# Patient Record
Sex: MALE | Race: WHITE | NOT HISPANIC OR LATINO | Employment: FULL TIME | URBAN - METROPOLITAN AREA
[De-identification: names, ages, dates, MRNs, and addresses within clinical notes are randomized per-mention and may not be internally consistent; named-entity substitution may affect disease eponyms.]

---

## 2017-01-05 ENCOUNTER — APPOINTMENT (OUTPATIENT)
Dept: PHYSICAL THERAPY | Age: 32
End: 2017-01-05
Payer: OTHER MISCELLANEOUS

## 2017-01-05 PROCEDURE — 97161 PT EVAL LOW COMPLEX 20 MIN: CPT

## 2017-01-09 ENCOUNTER — APPOINTMENT (OUTPATIENT)
Dept: PHYSICAL THERAPY | Age: 32
End: 2017-01-09
Payer: OTHER MISCELLANEOUS

## 2017-01-09 PROCEDURE — 97010 HOT OR COLD PACKS THERAPY: CPT

## 2017-01-09 PROCEDURE — 97110 THERAPEUTIC EXERCISES: CPT

## 2017-01-12 ENCOUNTER — APPOINTMENT (OUTPATIENT)
Dept: PHYSICAL THERAPY | Age: 32
End: 2017-01-12
Payer: OTHER MISCELLANEOUS

## 2017-01-12 PROCEDURE — 97010 HOT OR COLD PACKS THERAPY: CPT

## 2017-01-12 PROCEDURE — 97110 THERAPEUTIC EXERCISES: CPT

## 2017-01-16 ENCOUNTER — APPOINTMENT (OUTPATIENT)
Dept: PHYSICAL THERAPY | Age: 32
End: 2017-01-16
Payer: OTHER MISCELLANEOUS

## 2017-01-16 PROCEDURE — 97110 THERAPEUTIC EXERCISES: CPT

## 2017-01-16 PROCEDURE — 97010 HOT OR COLD PACKS THERAPY: CPT

## 2017-01-16 PROCEDURE — 97140 MANUAL THERAPY 1/> REGIONS: CPT

## 2017-01-18 ENCOUNTER — APPOINTMENT (OUTPATIENT)
Dept: PHYSICAL THERAPY | Age: 32
End: 2017-01-18
Payer: OTHER MISCELLANEOUS

## 2017-01-18 PROCEDURE — 97140 MANUAL THERAPY 1/> REGIONS: CPT

## 2017-01-18 PROCEDURE — 97110 THERAPEUTIC EXERCISES: CPT

## 2017-01-18 PROCEDURE — 97010 HOT OR COLD PACKS THERAPY: CPT

## 2017-01-23 ENCOUNTER — APPOINTMENT (OUTPATIENT)
Dept: PHYSICAL THERAPY | Age: 32
End: 2017-01-23
Payer: OTHER MISCELLANEOUS

## 2017-01-23 PROCEDURE — 97140 MANUAL THERAPY 1/> REGIONS: CPT

## 2017-01-23 PROCEDURE — 97110 THERAPEUTIC EXERCISES: CPT

## 2017-01-26 ENCOUNTER — APPOINTMENT (OUTPATIENT)
Dept: PHYSICAL THERAPY | Age: 32
End: 2017-01-26
Payer: OTHER MISCELLANEOUS

## 2017-01-26 PROCEDURE — 97110 THERAPEUTIC EXERCISES: CPT

## 2017-01-26 PROCEDURE — 97140 MANUAL THERAPY 1/> REGIONS: CPT

## 2017-01-30 ENCOUNTER — APPOINTMENT (OUTPATIENT)
Dept: PHYSICAL THERAPY | Age: 32
End: 2017-01-30
Payer: OTHER MISCELLANEOUS

## 2017-01-30 PROCEDURE — 97140 MANUAL THERAPY 1/> REGIONS: CPT

## 2017-01-30 PROCEDURE — 97110 THERAPEUTIC EXERCISES: CPT

## 2017-01-30 PROCEDURE — 97010 HOT OR COLD PACKS THERAPY: CPT

## 2017-02-02 ENCOUNTER — APPOINTMENT (OUTPATIENT)
Dept: PHYSICAL THERAPY | Age: 32
End: 2017-02-02
Payer: OTHER MISCELLANEOUS

## 2017-02-02 PROCEDURE — 97140 MANUAL THERAPY 1/> REGIONS: CPT

## 2017-02-02 PROCEDURE — 97110 THERAPEUTIC EXERCISES: CPT

## 2017-02-06 ENCOUNTER — APPOINTMENT (OUTPATIENT)
Dept: PHYSICAL THERAPY | Age: 32
End: 2017-02-06
Payer: OTHER MISCELLANEOUS

## 2017-02-06 PROCEDURE — 97140 MANUAL THERAPY 1/> REGIONS: CPT

## 2017-02-06 PROCEDURE — 97010 HOT OR COLD PACKS THERAPY: CPT

## 2017-02-06 PROCEDURE — 97110 THERAPEUTIC EXERCISES: CPT

## 2017-02-08 ENCOUNTER — APPOINTMENT (OUTPATIENT)
Dept: PHYSICAL THERAPY | Age: 32
End: 2017-02-08
Payer: OTHER MISCELLANEOUS

## 2017-02-08 PROCEDURE — 97110 THERAPEUTIC EXERCISES: CPT

## 2017-02-08 PROCEDURE — 97140 MANUAL THERAPY 1/> REGIONS: CPT

## 2017-02-08 PROCEDURE — 97010 HOT OR COLD PACKS THERAPY: CPT

## 2017-02-09 ENCOUNTER — APPOINTMENT (OUTPATIENT)
Dept: PHYSICAL THERAPY | Age: 32
End: 2017-02-09
Payer: OTHER MISCELLANEOUS

## 2017-02-14 ENCOUNTER — APPOINTMENT (OUTPATIENT)
Dept: PHYSICAL THERAPY | Age: 32
End: 2017-02-14
Payer: OTHER MISCELLANEOUS

## 2017-02-14 PROCEDURE — 97140 MANUAL THERAPY 1/> REGIONS: CPT

## 2017-02-14 PROCEDURE — 97010 HOT OR COLD PACKS THERAPY: CPT

## 2017-02-14 PROCEDURE — 97110 THERAPEUTIC EXERCISES: CPT

## 2017-02-16 ENCOUNTER — APPOINTMENT (OUTPATIENT)
Dept: PHYSICAL THERAPY | Age: 32
End: 2017-02-16
Payer: OTHER MISCELLANEOUS

## 2017-02-16 PROCEDURE — 97140 MANUAL THERAPY 1/> REGIONS: CPT

## 2017-02-16 PROCEDURE — 97110 THERAPEUTIC EXERCISES: CPT

## 2017-02-16 PROCEDURE — 97010 HOT OR COLD PACKS THERAPY: CPT

## 2017-02-20 ENCOUNTER — APPOINTMENT (OUTPATIENT)
Dept: PHYSICAL THERAPY | Age: 32
End: 2017-02-20
Payer: OTHER MISCELLANEOUS

## 2017-02-20 PROCEDURE — 97010 HOT OR COLD PACKS THERAPY: CPT

## 2017-02-20 PROCEDURE — 97140 MANUAL THERAPY 1/> REGIONS: CPT

## 2017-02-20 PROCEDURE — 97110 THERAPEUTIC EXERCISES: CPT

## 2017-02-23 ENCOUNTER — APPOINTMENT (OUTPATIENT)
Dept: PHYSICAL THERAPY | Age: 32
End: 2017-02-23
Payer: OTHER MISCELLANEOUS

## 2017-02-23 PROCEDURE — 97110 THERAPEUTIC EXERCISES: CPT

## 2017-02-23 PROCEDURE — 97140 MANUAL THERAPY 1/> REGIONS: CPT

## 2017-02-23 PROCEDURE — 97010 HOT OR COLD PACKS THERAPY: CPT

## 2017-02-27 ENCOUNTER — APPOINTMENT (OUTPATIENT)
Dept: PHYSICAL THERAPY | Age: 32
End: 2017-02-27
Payer: OTHER MISCELLANEOUS

## 2017-02-27 PROCEDURE — 97110 THERAPEUTIC EXERCISES: CPT

## 2017-03-01 ENCOUNTER — APPOINTMENT (OUTPATIENT)
Dept: PHYSICAL THERAPY | Age: 32
End: 2017-03-01
Payer: OTHER MISCELLANEOUS

## 2017-03-01 PROCEDURE — 97140 MANUAL THERAPY 1/> REGIONS: CPT

## 2017-03-01 PROCEDURE — 97110 THERAPEUTIC EXERCISES: CPT

## 2017-03-02 ENCOUNTER — APPOINTMENT (OUTPATIENT)
Dept: PHYSICAL THERAPY | Age: 32
End: 2017-03-02
Payer: OTHER MISCELLANEOUS

## 2017-03-02 PROCEDURE — 97110 THERAPEUTIC EXERCISES: CPT

## 2017-03-02 PROCEDURE — 97140 MANUAL THERAPY 1/> REGIONS: CPT

## 2017-03-13 ENCOUNTER — APPOINTMENT (OUTPATIENT)
Dept: PHYSICAL THERAPY | Age: 32
End: 2017-03-13
Payer: OTHER MISCELLANEOUS

## 2017-03-13 PROCEDURE — 97110 THERAPEUTIC EXERCISES: CPT

## 2017-03-13 PROCEDURE — 97010 HOT OR COLD PACKS THERAPY: CPT

## 2017-03-15 ENCOUNTER — APPOINTMENT (OUTPATIENT)
Dept: PHYSICAL THERAPY | Age: 32
End: 2017-03-15
Payer: OTHER MISCELLANEOUS

## 2017-03-15 PROCEDURE — 97010 HOT OR COLD PACKS THERAPY: CPT

## 2017-03-15 PROCEDURE — 97110 THERAPEUTIC EXERCISES: CPT

## 2017-03-15 PROCEDURE — 97112 NEUROMUSCULAR REEDUCATION: CPT

## 2017-03-16 ENCOUNTER — APPOINTMENT (OUTPATIENT)
Dept: PHYSICAL THERAPY | Age: 32
End: 2017-03-16
Payer: OTHER MISCELLANEOUS

## 2017-03-16 PROCEDURE — 97110 THERAPEUTIC EXERCISES: CPT

## 2017-03-16 PROCEDURE — 97140 MANUAL THERAPY 1/> REGIONS: CPT

## 2019-05-06 ENCOUNTER — APPOINTMENT (EMERGENCY)
Dept: CT IMAGING | Facility: HOSPITAL | Age: 34
End: 2019-05-06
Payer: COMMERCIAL

## 2019-05-06 ENCOUNTER — HOSPITAL ENCOUNTER (EMERGENCY)
Facility: HOSPITAL | Age: 34
Discharge: HOME | End: 2019-05-06
Attending: EMERGENCY MEDICINE | Admitting: EMERGENCY MEDICINE
Payer: COMMERCIAL

## 2019-05-06 VITALS
SYSTOLIC BLOOD PRESSURE: 143 MMHG | TEMPERATURE: 97.5 F | HEIGHT: 72 IN | HEART RATE: 56 BPM | RESPIRATION RATE: 20 BRPM | BODY MASS INDEX: 37.89 KG/M2 | DIASTOLIC BLOOD PRESSURE: 72 MMHG | WEIGHT: 279.76 LBS | OXYGEN SATURATION: 100 %

## 2019-05-06 DIAGNOSIS — N20.0 KIDNEY STONE ON LEFT SIDE: Primary | ICD-10-CM

## 2019-05-06 LAB
ALBUMIN SERPL BCP-MCNC: 4 G/DL (ref 3.5–5)
ALP SERPL-CCNC: 90 U/L (ref 46–116)
ALT SERPL W P-5'-P-CCNC: 24 U/L (ref 12–78)
ANION GAP SERPL CALCULATED.3IONS-SCNC: 6 MMOL/L (ref 4–13)
AST SERPL W P-5'-P-CCNC: 12 U/L (ref 5–45)
BACTERIA UR QL AUTO: ABNORMAL /HPF
BASOPHILS # BLD AUTO: 0.03 THOUSANDS/ΜL (ref 0–0.1)
BASOPHILS NFR BLD AUTO: 0 % (ref 0–1)
BILIRUB SERPL-MCNC: 0.5 MG/DL (ref 0.2–1)
BILIRUB UR QL STRIP: NEGATIVE
BUN SERPL-MCNC: 17 MG/DL (ref 5–25)
CALCIUM SERPL-MCNC: 9.3 MG/DL (ref 8.3–10.1)
CAOX CRY URNS QL MICRO: ABNORMAL /HPF
CHLORIDE SERPL-SCNC: 102 MMOL/L (ref 100–108)
CLARITY UR: CLEAR
CO2 SERPL-SCNC: 31 MMOL/L (ref 21–32)
COLOR UR: YELLOW
CREAT SERPL-MCNC: 0.86 MG/DL (ref 0.6–1.3)
EOSINOPHIL # BLD AUTO: 0.19 THOUSAND/ΜL (ref 0–0.61)
EOSINOPHIL NFR BLD AUTO: 3 % (ref 0–6)
ERYTHROCYTE [DISTWIDTH] IN BLOOD BY AUTOMATED COUNT: 13.1 % (ref 11.6–15.1)
GFR SERPL CREATININE-BSD FRML MDRD: 112 ML/MIN/1.73SQ M
GLUCOSE SERPL-MCNC: 97 MG/DL (ref 65–140)
GLUCOSE UR STRIP-MCNC: NEGATIVE MG/DL
HCT VFR BLD AUTO: 45.5 % (ref 36.5–49.3)
HGB BLD-MCNC: 15.4 G/DL (ref 12–17)
HGB UR QL STRIP.AUTO: ABNORMAL
IMM GRANULOCYTES # BLD AUTO: 0.02 THOUSAND/UL (ref 0–0.2)
IMM GRANULOCYTES NFR BLD AUTO: 0 % (ref 0–2)
KETONES UR STRIP-MCNC: ABNORMAL MG/DL
LEUKOCYTE ESTERASE UR QL STRIP: ABNORMAL
LIPASE SERPL-CCNC: 226 U/L (ref 73–393)
LYMPHOCYTES # BLD AUTO: 2.62 THOUSANDS/ΜL (ref 0.6–4.47)
LYMPHOCYTES NFR BLD AUTO: 35 % (ref 14–44)
MCH RBC QN AUTO: 30.5 PG (ref 26.8–34.3)
MCHC RBC AUTO-ENTMCNC: 33.8 G/DL (ref 31.4–37.4)
MCV RBC AUTO: 90 FL (ref 82–98)
MONOCYTES # BLD AUTO: 0.6 THOUSAND/ΜL (ref 0.17–1.22)
MONOCYTES NFR BLD AUTO: 8 % (ref 4–12)
MUCOUS THREADS UR QL AUTO: ABNORMAL
NEUTROPHILS # BLD AUTO: 4.07 THOUSANDS/ΜL (ref 1.85–7.62)
NEUTS SEG NFR BLD AUTO: 54 % (ref 43–75)
NITRITE UR QL STRIP: NEGATIVE
NON-SQ EPI CELLS URNS QL MICRO: ABNORMAL /HPF
NRBC BLD AUTO-RTO: 0 /100 WBCS
PH UR STRIP.AUTO: 5.5 [PH]
PLATELET # BLD AUTO: 198 THOUSANDS/UL (ref 149–390)
PMV BLD AUTO: 10.6 FL (ref 8.9–12.7)
POTASSIUM SERPL-SCNC: 3.7 MMOL/L (ref 3.5–5.3)
PROT SERPL-MCNC: 7.6 G/DL (ref 6.4–8.2)
PROT UR STRIP-MCNC: ABNORMAL MG/DL
RBC # BLD AUTO: 5.05 MILLION/UL (ref 3.88–5.62)
RBC #/AREA URNS AUTO: ABNORMAL /HPF
SODIUM SERPL-SCNC: 139 MMOL/L (ref 136–145)
SP GR UR STRIP.AUTO: >=1.03 (ref 1–1.03)
UROBILINOGEN UR QL STRIP.AUTO: 1 E.U./DL
WBC # BLD AUTO: 7.53 THOUSAND/UL (ref 4.31–10.16)
WBC #/AREA URNS AUTO: ABNORMAL /HPF

## 2019-05-06 PROCEDURE — 80053 COMPREHEN METABOLIC PANEL: CPT | Performed by: EMERGENCY MEDICINE

## 2019-05-06 PROCEDURE — 85025 COMPLETE CBC W/AUTO DIFF WBC: CPT | Performed by: EMERGENCY MEDICINE

## 2019-05-06 PROCEDURE — 96374 THER/PROPH/DIAG INJ IV PUSH: CPT

## 2019-05-06 PROCEDURE — 96375 TX/PRO/DX INJ NEW DRUG ADDON: CPT

## 2019-05-06 PROCEDURE — 83690 ASSAY OF LIPASE: CPT | Performed by: EMERGENCY MEDICINE

## 2019-05-06 PROCEDURE — 99284 EMERGENCY DEPT VISIT MOD MDM: CPT

## 2019-05-06 PROCEDURE — 99284 EMERGENCY DEPT VISIT MOD MDM: CPT | Performed by: EMERGENCY MEDICINE

## 2019-05-06 PROCEDURE — 81001 URINALYSIS AUTO W/SCOPE: CPT | Performed by: EMERGENCY MEDICINE

## 2019-05-06 PROCEDURE — 74176 CT ABD & PELVIS W/O CONTRAST: CPT

## 2019-05-06 PROCEDURE — 36415 COLL VENOUS BLD VENIPUNCTURE: CPT | Performed by: EMERGENCY MEDICINE

## 2019-05-06 RX ORDER — TAMSULOSIN HYDROCHLORIDE 0.4 MG/1
0.4 CAPSULE ORAL
Qty: 7 CAPSULE | Refills: 0 | Status: SHIPPED | OUTPATIENT
Start: 2019-05-06 | End: 2020-03-04

## 2019-05-06 RX ORDER — NAPROXEN 500 MG/1
500 TABLET ORAL 2 TIMES DAILY PRN
Qty: 20 TABLET | Refills: 0 | Status: SHIPPED | OUTPATIENT
Start: 2019-05-06 | End: 2020-03-04

## 2019-05-06 RX ORDER — ONDANSETRON 2 MG/ML
4 INJECTION INTRAMUSCULAR; INTRAVENOUS ONCE
Status: COMPLETED | OUTPATIENT
Start: 2019-05-06 | End: 2019-05-06

## 2019-05-06 RX ORDER — HYDROMORPHONE HCL/PF 1 MG/ML
1 SYRINGE (ML) INJECTION ONCE
Status: DISCONTINUED | OUTPATIENT
Start: 2019-05-06 | End: 2019-05-06

## 2019-05-06 RX ORDER — KETOROLAC TROMETHAMINE 30 MG/ML
15 INJECTION, SOLUTION INTRAMUSCULAR; INTRAVENOUS ONCE
Status: COMPLETED | OUTPATIENT
Start: 2019-05-06 | End: 2019-05-06

## 2019-05-06 RX ORDER — MORPHINE SULFATE 15 MG/1
15 TABLET ORAL EVERY 6 HOURS PRN
Qty: 7 TABLET | Refills: 0 | Status: SHIPPED | OUTPATIENT
Start: 2019-05-06 | End: 2019-05-13

## 2019-05-06 RX ORDER — ONDANSETRON 4 MG/1
4 TABLET, ORALLY DISINTEGRATING ORAL EVERY 8 HOURS PRN
Qty: 10 TABLET | Refills: 0 | Status: SHIPPED | OUTPATIENT
Start: 2019-05-06 | End: 2020-03-04

## 2019-05-06 RX ORDER — HYDROMORPHONE HCL/PF 1 MG/ML
1 SYRINGE (ML) INJECTION ONCE AS NEEDED
Status: DISCONTINUED | OUTPATIENT
Start: 2019-05-06 | End: 2019-05-06 | Stop reason: HOSPADM

## 2019-05-06 RX ADMIN — ONDANSETRON 4 MG: 2 INJECTION INTRAMUSCULAR; INTRAVENOUS at 00:57

## 2019-05-06 RX ADMIN — KETOROLAC TROMETHAMINE 15 MG: 30 INJECTION, SOLUTION INTRAMUSCULAR at 00:57

## 2020-02-27 ENCOUNTER — OFFICE VISIT (OUTPATIENT)
Dept: URGENT CARE | Facility: CLINIC | Age: 35
End: 2020-02-27
Payer: COMMERCIAL

## 2020-02-27 VITALS
HEIGHT: 72 IN | OXYGEN SATURATION: 96 % | HEART RATE: 66 BPM | TEMPERATURE: 99.3 F | SYSTOLIC BLOOD PRESSURE: 125 MMHG | WEIGHT: 275 LBS | RESPIRATION RATE: 18 BRPM | DIASTOLIC BLOOD PRESSURE: 78 MMHG | BODY MASS INDEX: 37.25 KG/M2

## 2020-02-27 DIAGNOSIS — R51.9 NONINTRACTABLE HEADACHE, UNSPECIFIED CHRONICITY PATTERN, UNSPECIFIED HEADACHE TYPE: Primary | ICD-10-CM

## 2020-02-27 DIAGNOSIS — R42 DIZZINESS AND GIDDINESS: ICD-10-CM

## 2020-02-27 PROCEDURE — 99203 OFFICE O/P NEW LOW 30 MIN: CPT | Performed by: PHYSICIAN ASSISTANT

## 2020-02-27 NOTE — PROGRESS NOTES
[facility]        NAME: Alverto Contreras is a 38 y o  male  : 1985    MRN: [id number]  DATE: 2020  TIME: 7:12 PM    Assessment and Plan   Nonintractable headache, unspecified chronicity pattern, unspecified headache type [R51]  1  Nonintractable headache, unspecified chronicity pattern, unspecified headache type     2  Dizziness and giddiness           Patient Instructions     Patient Instructions   1  Headaches/Dizziness/elevated BP readings at home  -BP here in office is 125/78  -Given patient's symptoms I feel that patient needs a full work-up and bloodwork performed which I cannot do here  Therefore I recommend that he go to the ER for further evaluation and management     Follow up with PCP in 3-5 days  Proceed to  ER if symptoms worsen  Chief Complaint     Chief Complaint   Patient presents with    Headache     c/o of headaches and feeling off all week  History of Present Illness       The patient presents today for an evaluation of elevated BP readings on and off during the week  The highest has been 170s over 90s  The patient states that he has been having the nurse take it at work and they bought an at home cuff today  When they took it today it was 174/93  The patient states that he has been feeling fatigued and dizzy on and off all week  The patient's face and ears also have been getting flushed intermittently  He states that he overall has been feeling "off " The patient admits to a headache currently on his temples  He rates his pain as a 2-3/10 currently  The patient tried tylenol with some relief  The patient has a new patient appt with a PCP on  with Dr Kalee Brody  The patient hasnt see a PCP in years  The last time patient had bloodwork was 1 year ago after he had a gastric sleeve procedure  Review of Systems   Review of Systems   Constitutional: Negative for chills and fever  HENT: Negative for ear pain, rhinorrhea and sore throat      Eyes: Negative for visual disturbance  Respiratory: Negative for shortness of breath  Cardiovascular: Negative for chest pain  Gastrointestinal: Negative for abdominal pain, diarrhea and vomiting  Genitourinary: Negative for dysuria and frequency  Musculoskeletal: Negative for arthralgias  Skin: Negative for rash  Neurological: Positive for dizziness and headaches  Negative for weakness and numbness  All other systems reviewed and are negative  Current Medications       Current Outpatient Medications:     Cyanocobalamin (VITAMIN B-12 PO), Take 100 mcg by mouth daily, Disp: , Rfl:     Omeprazole 20 MG TBDD, Take 20 mg by mouth daily, Disp: , Rfl:     VITAMIN D PO, Take 1 tablet by mouth 2 (two) times a day, Disp: , Rfl:     naproxen (NAPROSYN) 500 mg tablet, Take 1 tablet (500 mg total) by mouth 2 (two) times a day as needed for mild pain (take with food) for up to 20 doses (Patient not taking: Reported on 2/27/2020), Disp: 20 tablet, Rfl: 0    ondansetron (ZOFRAN-ODT) 4 mg disintegrating tablet, Take 1 tablet (4 mg total) by mouth every 8 (eight) hours as needed for nausea or vomiting (Patient not taking: Reported on 2/27/2020), Disp: 10 tablet, Rfl: 0    tamsulosin (FLOMAX) 0 4 mg, Take 1 capsule (0 4 mg total) by mouth daily with dinner (Patient not taking: Reported on 2/27/2020), Disp: 7 capsule, Rfl: 0    Current Allergies     Allergies as of 02/27/2020 - Reviewed 02/27/2020   Allergen Reaction Noted    Penicillins Anaphylaxis 05/06/2019    Meloxicam Rash 10/04/2017            The following portions of the patient's history were reviewed and updated as appropriate: allergies, current medications, past family history, past medical history, past social history, past surgical history and problem list      History reviewed  No pertinent past medical history      Past Surgical History:   Procedure Laterality Date    ABDOMINAL SURGERY      gastric sleeve    KNEE SURGERY Left        Family History   Problem Relation Age of Onset    No Known Problems Mother     No Known Problems Father          Medications have been verified  Objective   /78   Pulse 66   Temp 99 3 °F (37 4 °C) (Temporal)   Resp 18   Ht 6' (1 829 m)   Wt 125 kg (275 lb)   SpO2 96%   BMI 37 30 kg/m²        Physical Exam     Physical Exam   Constitutional: He is oriented to person, place, and time  He appears well-developed and well-nourished  No distress  HENT:   Head: Normocephalic and atraumatic  Right Ear: Tympanic membrane, external ear and ear canal normal    Left Ear: Tympanic membrane, external ear and ear canal normal    Nose: Nose normal    Mouth/Throat: Uvula is midline, oropharynx is clear and moist and mucous membranes are normal    Eyes: Pupils are equal, round, and reactive to light  Conjunctivae and EOM are normal    Neck: Normal range of motion  Neck supple  Cardiovascular: Normal rate, regular rhythm and normal heart sounds  Pulmonary/Chest: Effort normal and breath sounds normal    Lymphadenopathy:     He has no cervical adenopathy  Neurological: He is alert and oriented to person, place, and time  He has normal strength  Coordination and gait normal    Skin: Skin is warm and dry  Psychiatric: He has a normal mood and affect  Nursing note and vitals reviewed

## 2020-02-28 NOTE — PATIENT INSTRUCTIONS
1  Headaches/Dizziness/elevated BP readings at home  -BP here in office is 125/78  -Given patient's symptoms I feel that patient needs a full work-up and bloodwork performed which I cannot do here   Therefore I recommend that he go to the ER for further evaluation and management

## 2020-03-04 ENCOUNTER — HOSPITAL ENCOUNTER (EMERGENCY)
Facility: HOSPITAL | Age: 35
Discharge: HOME | End: 2020-03-04
Admitting: EMERGENCY MEDICINE
Payer: COMMERCIAL

## 2020-03-04 ENCOUNTER — APPOINTMENT (EMERGENCY)
Dept: CT IMAGING | Facility: HOSPITAL | Age: 35
End: 2020-03-04
Payer: COMMERCIAL

## 2020-03-04 VITALS
OXYGEN SATURATION: 97 % | BODY MASS INDEX: 40.07 KG/M2 | HEART RATE: 61 BPM | WEIGHT: 295.42 LBS | RESPIRATION RATE: 16 BRPM | DIASTOLIC BLOOD PRESSURE: 74 MMHG | TEMPERATURE: 98.8 F | SYSTOLIC BLOOD PRESSURE: 125 MMHG

## 2020-03-04 DIAGNOSIS — N20.1 RIGHT URETERAL STONE: Primary | ICD-10-CM

## 2020-03-04 DIAGNOSIS — N20.0 NEPHROLITHIASIS: ICD-10-CM

## 2020-03-04 LAB
ANION GAP SERPL CALCULATED.3IONS-SCNC: 10 MMOL/L (ref 4–13)
BACTERIA UR QL AUTO: ABNORMAL /HPF
BASOPHILS # BLD AUTO: 0.04 THOUSANDS/ΜL (ref 0–0.1)
BASOPHILS NFR BLD AUTO: 1 % (ref 0–1)
BILIRUB UR QL STRIP: ABNORMAL
BUN SERPL-MCNC: 16 MG/DL (ref 5–25)
CALCIUM SERPL-MCNC: 9.3 MG/DL (ref 8.3–10.1)
CHLORIDE SERPL-SCNC: 102 MMOL/L (ref 100–108)
CLARITY UR: ABNORMAL
CO2 SERPL-SCNC: 27 MMOL/L (ref 21–32)
COLOR UR: YELLOW
COLOR, POC: YELLOW
CREAT SERPL-MCNC: 0.77 MG/DL (ref 0.6–1.3)
EOSINOPHIL # BLD AUTO: 0.12 THOUSAND/ΜL (ref 0–0.61)
EOSINOPHIL NFR BLD AUTO: 2 % (ref 0–6)
ERYTHROCYTE [DISTWIDTH] IN BLOOD BY AUTOMATED COUNT: 13.1 % (ref 11.6–15.1)
GFR SERPL CREATININE-BSD FRML MDRD: 116 ML/MIN/1.73SQ M
GLUCOSE SERPL-MCNC: 111 MG/DL (ref 65–140)
GLUCOSE UR STRIP-MCNC: NEGATIVE MG/DL
HCT VFR BLD AUTO: 49.4 % (ref 36.5–49.3)
HGB BLD-MCNC: 15.9 G/DL (ref 12–17)
HGB UR QL STRIP.AUTO: ABNORMAL
IMM GRANULOCYTES # BLD AUTO: 0.03 THOUSAND/UL (ref 0–0.2)
IMM GRANULOCYTES NFR BLD AUTO: 0 % (ref 0–2)
KETONES UR STRIP-MCNC: ABNORMAL MG/DL
LEUKOCYTE ESTERASE UR QL STRIP: NEGATIVE
LYMPHOCYTES # BLD AUTO: 2.37 THOUSANDS/ΜL (ref 0.6–4.47)
LYMPHOCYTES NFR BLD AUTO: 32 % (ref 14–44)
MCH RBC QN AUTO: 29.6 PG (ref 26.8–34.3)
MCHC RBC AUTO-ENTMCNC: 32.2 G/DL (ref 31.4–37.4)
MCV RBC AUTO: 92 FL (ref 82–98)
MONOCYTES # BLD AUTO: 0.55 THOUSAND/ΜL (ref 0.17–1.22)
MONOCYTES NFR BLD AUTO: 7 % (ref 4–12)
NEUTROPHILS # BLD AUTO: 4.31 THOUSANDS/ΜL (ref 1.85–7.62)
NEUTS SEG NFR BLD AUTO: 58 % (ref 43–75)
NITRITE UR QL STRIP: NEGATIVE
NON-SQ EPI CELLS URNS QL MICRO: ABNORMAL /HPF
NRBC BLD AUTO-RTO: 0 /100 WBCS
PH UR STRIP.AUTO: 5.5 [PH] (ref 4.5–8)
PLATELET # BLD AUTO: 197 THOUSANDS/UL (ref 149–390)
PMV BLD AUTO: 10.9 FL (ref 8.9–12.7)
POTASSIUM SERPL-SCNC: 4.8 MMOL/L (ref 3.5–5.3)
PROT UR STRIP-MCNC: ABNORMAL MG/DL
RBC # BLD AUTO: 5.37 MILLION/UL (ref 3.88–5.62)
RBC #/AREA URNS AUTO: ABNORMAL /HPF
SODIUM SERPL-SCNC: 139 MMOL/L (ref 136–145)
SP GR UR STRIP.AUTO: >=1.03 (ref 1–1.03)
URINE COMMENT: ABNORMAL
UROBILINOGEN UR QL STRIP.AUTO: 2 E.U./DL
WBC # BLD AUTO: 7.42 THOUSAND/UL (ref 4.31–10.16)
WBC #/AREA URNS AUTO: ABNORMAL /HPF

## 2020-03-04 PROCEDURE — 96374 THER/PROPH/DIAG INJ IV PUSH: CPT

## 2020-03-04 PROCEDURE — 74176 CT ABD & PELVIS W/O CONTRAST: CPT

## 2020-03-04 PROCEDURE — 81001 URINALYSIS AUTO W/SCOPE: CPT

## 2020-03-04 PROCEDURE — 36415 COLL VENOUS BLD VENIPUNCTURE: CPT | Performed by: PHYSICIAN ASSISTANT

## 2020-03-04 PROCEDURE — 80048 BASIC METABOLIC PNL TOTAL CA: CPT | Performed by: PHYSICIAN ASSISTANT

## 2020-03-04 PROCEDURE — 99284 EMERGENCY DEPT VISIT MOD MDM: CPT | Performed by: PHYSICIAN ASSISTANT

## 2020-03-04 PROCEDURE — 85025 COMPLETE CBC W/AUTO DIFF WBC: CPT | Performed by: PHYSICIAN ASSISTANT

## 2020-03-04 PROCEDURE — 99284 EMERGENCY DEPT VISIT MOD MDM: CPT

## 2020-03-04 RX ORDER — NAPROXEN 500 MG/1
500 TABLET ORAL 2 TIMES DAILY WITH MEALS
Qty: 30 TABLET | Refills: 0 | Status: SHIPPED | OUTPATIENT
Start: 2020-03-04 | End: 2020-08-18

## 2020-03-04 RX ORDER — TAMSULOSIN HYDROCHLORIDE 0.4 MG/1
0.4 CAPSULE ORAL
Qty: 10 CAPSULE | Refills: 0 | Status: SHIPPED | OUTPATIENT
Start: 2020-03-04 | End: 2020-08-18

## 2020-03-04 RX ORDER — OXYCODONE HYDROCHLORIDE AND ACETAMINOPHEN 5; 325 MG/1; MG/1
1 TABLET ORAL EVERY 4 HOURS PRN
Qty: 10 TABLET | Refills: 0 | Status: SHIPPED | OUTPATIENT
Start: 2020-03-04 | End: 2020-03-06 | Stop reason: ALTCHOICE

## 2020-03-04 RX ORDER — KETOROLAC TROMETHAMINE 30 MG/ML
15 INJECTION, SOLUTION INTRAMUSCULAR; INTRAVENOUS ONCE
Status: COMPLETED | OUTPATIENT
Start: 2020-03-04 | End: 2020-03-04

## 2020-03-04 RX ADMIN — KETOROLAC TROMETHAMINE 15 MG: 30 INJECTION, SOLUTION INTRAMUSCULAR at 13:13

## 2020-03-04 NOTE — DISCHARGE INSTRUCTIONS
DISCHARGE INSTRUCTIONS:    FOLLOW UP WITH YOUR PRIMARY CARE PROVIDER OR THE [address] [place] [address]  MAKE AN APPOINTMENT TO BE SEEN  TAKE MEDICATIONS AS PRESCRIBED  IF RASH, SHORTNESS OF BREATH OR TROUBLE SWALLOWING, STOP TAKING THE MEDICATION AND BE SEEN  REST AND DRINK PLENTY OF FLUIDS  FOLLOW UP WITH UROLOGY  IF SYMPTOMS WORSEN OR NEW SYMPTOMS ARISE, RETURN TO THE ER TO BE SEEN

## 2020-03-04 NOTE — ED PROVIDER NOTES
History  Chief Complaint   Patient presents with    Flank Pain     R sided flank pain for 45 minutes  pt reports hx of kidney stones and states this pain is similar  pt reports having HTn and having a follow up appointment this week      35y  o male with no significant PMH presents to the ER for right flank pain for 1 day  Patient denies taking any medication for pain  He describes his pain as sharp and radiating into his abdomen  Pain is constant  Associated symptoms: nausea  He denies fever, chills, chest pain, dyspnea, vomiting, diarrhea, urinary symptoms, weakness or paresthesias  He has a history of kidney stones and states this feels similar  History provided by:  Patient   used: No        Prior to Admission Medications   Prescriptions Last Dose Informant Patient Reported? Taking? Omeprazole 20 MG TBDD   Yes Yes   Sig: Take 20 mg by mouth daily      Facility-Administered Medications: None       History reviewed  No pertinent past medical history  Past Surgical History:   Procedure Laterality Date    ABDOMINAL SURGERY      gastric sleeve    KNEE SURGERY Left        Family History   Problem Relation Age of Onset    No Known Problems Mother     No Known Problems Father      I have reviewed and agree with the history as documented  E-Cigarette/Vaping    E-Cigarette Use Never User      E-Cigarette/Vaping Substances     Social History     Tobacco Use    Smoking status: Never Smoker    Smokeless tobacco: Never Used   Substance Use Topics    Alcohol use: Not Currently    Drug use: Not Currently       Review of Systems   Constitutional: Negative for chills and fever  Eyes: Negative for redness  Respiratory: Negative for shortness of breath  Cardiovascular: Negative for chest pain  Gastrointestinal: Positive for abdominal pain and nausea  Negative for diarrhea and vomiting  Genitourinary: Positive for flank pain   Negative for dysuria, frequency, hematuria and urgency  Musculoskeletal: Negative for neck stiffness  Skin: Negative for rash  Allergic/Immunologic: Negative for food allergies  Neurological: Negative for weakness and numbness  Physical Exam  Physical Exam   Constitutional:  Non-toxic appearance  No distress  HENT:   Head: Normocephalic and atraumatic  Right Ear: Tympanic membrane, external ear and ear canal normal  No drainage, swelling or tenderness  No foreign bodies  Tympanic membrane is not erythematous  No hemotympanum  Left Ear: Tympanic membrane, external ear and ear canal normal  No drainage, swelling or tenderness  No foreign bodies  Tympanic membrane is not erythematous  No hemotympanum  Nose: Nose normal    Mouth/Throat: Uvula is midline, oropharynx is clear and moist and mucous membranes are normal  No uvula swelling  No posterior oropharyngeal edema, posterior oropharyngeal erythema or tonsillar abscesses  No tonsillar exudate  Neck: Normal range of motion and phonation normal  Neck supple  No tracheal deviation present  Cardiovascular: Normal rate, regular rhythm, S1 normal, S2 normal and normal heart sounds  Exam reveals no gallop and no friction rub  No murmur heard  Pulmonary/Chest: Effort normal and breath sounds normal  No respiratory distress  He has no decreased breath sounds  He has no wheezes  He has no rhonchi  He has no rales  He exhibits no tenderness  Abdominal: Soft  Bowel sounds are normal  He exhibits no distension  There is no tenderness  There is CVA tenderness (right)  There is no rebound and no guarding  Neurological: He is alert  GCS eye subscore is 4  GCS verbal subscore is 5  GCS motor subscore is 6  Skin: Skin is warm and dry  No rash noted  Psychiatric: He has a normal mood and affect  Nursing note and vitals reviewed        Vital Signs  ED Triage Vitals   Temperature Pulse Respirations Blood Pressure SpO2   03/04/20 1216 03/04/20 1216 03/04/20 1216 03/04/20 1216 03/04/20 1216   98 8 °F (37 1 °C) 71 18 148/96 98 %      Temp Source Heart Rate Source Patient Position - Orthostatic VS BP Location FiO2 (%)   03/04/20 1216 -- 03/04/20 1216 03/04/20 1216 --   Temporal  Sitting Right arm       Pain Score       03/04/20 1313       4           Vitals:    03/04/20 1216 03/04/20 1333   BP: 148/96 125/74   Pulse: 71 61   Patient Position - Orthostatic VS: Sitting          Visual Acuity      ED Medications  Medications   ketorolac (TORADOL) injection 15 mg (15 mg Intravenous Given 3/4/20 1313)       Diagnostic Studies  Results Reviewed     Procedure Component Value Units Date/Time    Basic metabolic panel [[id number]] Collected:  03/04/20 1229    Lab Status:  Final result Specimen:  Blood from Arm, Right Updated:  03/04/20 1335     Sodium 139 mmol/L      Potassium 4 8 mmol/L      Chloride 102 mmol/L      CO2 27 mmol/L      ANION GAP 10 mmol/L      BUN 16 mg/dL      Creatinine 0 77 mg/dL      Glucose 111 mg/dL      Calcium 9 3 mg/dL      eGFR 116 ml/min/1 73sq m     Narrative:       [facility] guidelines for Chronic Kidney Disease (CKD):     Stage 1 with normal or high GFR (GFR > 90 mL/min/1 73 square meters)    Stage 2 Mild CKD (GFR = 60-89 mL/min/1 73 square meters)    Stage 3A Moderate CKD (GFR = 45-59 mL/min/1 73 square meters)    Stage 3B Moderate CKD (GFR = 30-44 mL/min/1 73 square meters)    Stage 4 Severe CKD (GFR = 15-29 mL/min/1 73 square meters)    Stage 5 End Stage CKD (GFR <15 mL/min/1 73 square meters)  Note: GFR calculation is accurate only with a steady state creatinine    CBC and differential [[id number]]  (Abnormal) Collected:  03/04/20 1229    Lab Status:  Final result Specimen:  Blood from Arm, Right Updated:  03/04/20 1320     WBC 7 42 Thousand/uL      RBC 5 37 Million/uL      Hemoglobin 15 9 g/dL      Hematocrit 49 4 %      MCV 92 fL      MCH 29 6 pg      MCHC 32 2 g/dL      RDW 13 1 %      MPV 10 9 fL      Platelets 291 Thousands/uL      nRBC 0 /100 WBCs Neutrophils Relative 58 %      Immat GRANS % 0 %      Lymphocytes Relative 32 %      Monocytes Relative 7 %      Eosinophils Relative 2 %      Basophils Relative 1 %      Neutrophils Absolute 4 31 Thousands/µL      Immature Grans Absolute 0 03 Thousand/uL      Lymphocytes Absolute 2 37 Thousands/µL      Monocytes Absolute 0 55 Thousand/µL      Eosinophils Absolute 0 12 Thousand/µL      Basophils Absolute 0 04 Thousands/µL     Urine Microscopic [[id number]]  (Abnormal) Collected:  03/04/20 1231    Lab Status:  Final result Specimen:  Urine, Clean Catch Updated:  03/04/20 1310     RBC, UA Innumerable /hpf      WBC, UA 0-1 /hpf      Epithelial Cells None Seen /hpf      Bacteria, UA Occasional /hpf      URINE COMMENT Acetominophen crystals Present: Moderate    POCT urinalysis dipstick [[id number]]  (Normal) Resulted:  03/04/20 1233    Lab Status:  Final result Updated:  03/04/20 1234     Color, UA yellow    Urine Macroscopic, POC [[id number]]  (Abnormal) Collected:  03/04/20 1231    Lab Status:  Final result Specimen:  Urine Updated:  03/04/20 1233     Color, UA Yellow     Clarity, UA Turbid     pH, UA 5 5     Leukocytes, UA Negative     Nitrite, UA Negative     Protein,  (2+) mg/dl      Glucose, UA Negative mg/dl      Ketones, UA Trace mg/dl      Urobilinogen, UA 2 0 E U /dl      Bilirubin, UA Interference- unable to analyze     Blood, UA Large     Specific Gravity, UA >=1 030    Narrative:       CLINITEK RESULT                 CT renal stone study abdomen pelvis without contrast   Final Result by Wang Leos MD (03/04 1343)   Right greater than left nephrolithiasis as above with a punctate proximal right ureteral calculus identified resulting in mild right hydroureteronephrosis                 Workstation performed: [id number]                    Procedures  Procedures         ED Course                               MDM  Number of Diagnoses or Management Options  Nephrolithiasis: new and requires workup  Right ureteral stone: new and requires workup  Diagnosis management comments: DDX consists of but not limited to: kidney stone, UTI, pyelonephritis, abdominal pathology    Will check CBC, BMP, urine and CT stone study  Will give Toradol and fluids  Patient reports improvement in symptoms  Informed patient of lab and imaging findings  Will discharge  [place]  Findings below  At discharge, I instructed the patient to:  -follow up with pcp  -take medications as prescribed  -rest and drink plenty of fluids  -follow up with Urology  -return to the ER if symptoms worsened or new symptoms arose  Patient agreed to this plan and was stable at time of discharge  Amount and/or Complexity of Data Reviewed  Clinical lab tests: ordered and reviewed  Tests in the radiology section of CPT®: ordered and reviewed    Patient Progress  Patient progress: stable        Disposition  Final diagnoses:   Right ureteral stone   Nephrolithiasis     Time reflects when diagnosis was documented in both MDM as applicable and the Disposition within this note     Time User Action Codes Description Comment    3/4/2020  2:55 PM Rossy JOLLEY Add [N20 1] Right ureteral stone     3/4/2020  2:55 PM Rossy JOLLEY Add [N20 0] Nephrolithiasis       ED Disposition     ED Disposition Condition Date/Time Comment    Discharge Stable Wed Mar 4, 2020  2:54 PM Johanna Begum discharge to home/self care  Follow-up Information     Follow up With Specialties Details Why Contact Info Additional Information    Shanta Valenzuela MD [facility] Medicine Schedule an appointment as soon as possible for a visit   [place] [address]    [address] For Urology [facility] Schedule an appointment as soon as possible for a visit   [address] [address]  [address] [place] [address] For Urology [facility] Candi Cesar, [address], [address]   [phone]          Discharge Medication List as of 3/4/2020  2:58 PM      START taking these medications    Details   naproxen (NAPROSYN) 500 mg tablet Take 1 tablet (500 mg total) by mouth 2 (two) times a day with meals, Starting Wed 3/4/2020, Normal      oxyCODONE-acetaminophen (PERCOCET) 5-325 mg per tablet Take 1 tablet by mouth every 4 (four) hours as needed for moderate painMax Daily Amount: 6 tablets, Starting Wed 3/4/2020, Normal      tamsulosin (FLOMAX) 0 4 mg Take 1 capsule (0 4 mg total) by mouth daily with dinner, Starting Wed 3/4/2020, Normal         CONTINUE these medications which have NOT CHANGED    Details   Omeprazole 20 MG TBDD Take 20 mg by mouth daily, Starting Fri 5/10/2019, Until Sat 5/9/2020, Historical Med           No discharge procedures on file      PDMP Review     None          ED Provider  Electronically Signed by           Saint Planas, PA-C  03/04/20 1034

## 2020-03-04 NOTE — PROGRESS NOTES
UROLOGY NEW CONSULT NOTE     CHIEF COMPLAINT   Fitz Magaña is a 38 y o  male with a complaint of   Chief Complaint   Patient presents with    Nephrolithiasis       History of Present Illness:     38 y o  male  With a history of left-sided stones  Patient was recently seen in the emergency room with a 13 mm right lower pole stone as well as small ureteral fragment  Last night the patient had significant pain and feels like he passed the small fragment  He presents today for discussion  History of gastric bypass  Previously on calcium citrate but is now on calcium carbonate due to cost issues  Works as a     Past Medical History:   History reviewed  No pertinent past medical history  PAST SURGICAL HISTORY:     Past Surgical History:   Procedure Laterality Date    ABDOMINAL SURGERY      gastric sleeve    KNEE SURGERY Left        CURRENT MEDICATIONS:     Current Outpatient Medications   Medication Sig Dispense Refill    naproxen (NAPROSYN) 500 mg tablet Take 1 tablet (500 mg total) by mouth 2 (two) times a day with meals 30 tablet 0    Omeprazole 20 MG TBDD Take 20 mg by mouth daily      oxyCODONE-acetaminophen (PERCOCET) 5-325 mg per tablet Take 1 tablet by mouth every 4 (four) hours as needed for moderate painMax Daily Amount: 6 tablets 10 tablet 0    tamsulosin (FLOMAX) 0 4 mg Take 1 capsule (0 4 mg total) by mouth daily with dinner 10 capsule 0    calcium citrate (CALCITRATE) 950 MG tablet Take 1 tablet (950 mg total) by mouth 3 (three) times a day 90 tablet 6     No current facility-administered medications for this visit          ALLERGIES:     Allergies   Allergen Reactions    Penicillins Anaphylaxis    Meloxicam Rash       SOCIAL HISTORY:     Social History     Socioeconomic History    Marital status: /Civil Union     Spouse name: None    Number of children: None    Years of education: None    Highest education level: None   Occupational History    None Social Needs    Financial resource strain: None    Food insecurity:     Worry: None     Inability: None    Transportation needs:     Medical: None     Non-medical: None   Tobacco Use    Smoking status: Never Smoker    Smokeless tobacco: Never Used   Substance and Sexual Activity    Alcohol use: Not Currently    Drug use: Not Currently    Sexual activity: None   Lifestyle    Physical activity:     Days per week: None     Minutes per session: None    Stress: None   Relationships    Social connections:     Talks on phone: None     Gets together: None     Attends Worship service: None     Active member of club or organization: None     Attends meetings of clubs or organizations: None     Relationship status: None    Intimate partner violence:     Fear of current or ex partner: None     Emotionally abused: None     Physically abused: None     Forced sexual activity: None   Other Topics Concern    None   Social History Narrative    None       SOCIAL HISTORY:     Family History   Problem Relation Age of Onset    No Known Problems Mother     No Known Problems Father        REVIEW OF SYSTEMS:     Review of Systems   Constitutional: Negative  Negative for chills and fever  Respiratory: Negative  Cardiovascular: Negative  Gastrointestinal: Positive for abdominal pain  Genitourinary: Positive for flank pain  Skin: Negative  Psychiatric/Behavioral: Negative  PHYSICAL EXAM:     /70 (BP Location: Left arm, Patient Position: Standing, Cuff Size: Standard)   Pulse 71   Ht 6' (1 829 m)   Wt (!) 137 kg (301 lb)   BMI 40 82 kg/m²     General:  Healthy appearing male in no acute distress  They have a normal affect  There is not appear to be any gross neurologic defects or abnormalities  HEENT:  Normocephalic, atraumatic  Neck is supple without any palpable lymphadenopathy  Cardiovascular:  Patient has normal palpable distal radial pulses    There is no significant peripheral edema  No JVD is noted  Respiratory:  Patient has unlabored respirations  There is no audible wheeze or rhonchi  Abdomen:  Abdomen is  Healed gastric bypass surgical scars  Abdomen is soft and nontender  There is no tympany  Inguinal and umbilical hernia are not appreciated  Musculoskeletal:  Patient does not have significant CVA tenderness in the  flank with palpation or percussion  They full range of motion in all 4 extremities  Strength in all 4 extremities appears congruent  Patient is able to ambulate without assistance or difficulty  Dermatologic:  Patient has no skin abnormalities or rashes  LABS:     CBC:   Lab Results   Component Value Date    WBC 7 42 03/04/2020    HGB 15 9 03/04/2020    HCT 49 4 (H) 03/04/2020    MCV 92 03/04/2020     03/04/2020       BMP:   Lab Results   Component Value Date    CALCIUM 9 3 03/04/2020    K 4 8 03/04/2020    CO2 27 03/04/2020     03/04/2020    BUN 16 03/04/2020    CREATININE 0 77 03/04/2020       IMAGING:     3/4/20  CT ABDOMEN AND PELVIS WITHOUT IV CONTRAST - LOW DOSE RENAL STONE      INDICATION:   right flank pain      COMPARISON:  5/6/2019      TECHNIQUE:  Low dose thin section CT examination of the abdomen and pelvis was performed without intravenous or oral contrast according to a protocol specifically designed to evaluate for urinary tract calculus  Axial, sagittal, and coronal 2D   reformatted images were created from the source data and submitted for interpretation  Evaluation for pathology in the abdomen and pelvis that is unrelated to urinary tract calculi is limited       Radiation dose length product (DLP) for this visit:  493 mGy-cm     This examination, like all CT scans performed in the [facility], was performed utilizing techniques to minimize radiation dose exposure, including the use of iterative   reconstruction and automated exposure control       FINDINGS:     RIGHT KIDNEY AND URETER:  Nonobstructive inability to treat the stone at the index surgery and need for secondary procedures  Patient understands this risk and has signed informed consent  We did recommend restarting calcium citrate which I have prescribed  Taking calcium citrate 3 times a day with meals will help to bind the abundance of gut oxalate seen in gastric bypass patient is and hopefully help to reduce hyperoxaluria

## 2020-03-05 ENCOUNTER — TELEPHONE (OUTPATIENT)
Dept: UROLOGY | Facility: MEDICAL CENTER | Age: 35
End: 2020-03-05

## 2020-03-05 ENCOUNTER — OFFICE VISIT (OUTPATIENT)
Dept: UROLOGY | Facility: CLINIC | Age: 35
End: 2020-03-05
Payer: COMMERCIAL

## 2020-03-05 VITALS
WEIGHT: 301 LBS | DIASTOLIC BLOOD PRESSURE: 70 MMHG | HEART RATE: 71 BPM | SYSTOLIC BLOOD PRESSURE: 122 MMHG | HEIGHT: 72 IN | BODY MASS INDEX: 40.77 KG/M2

## 2020-03-05 DIAGNOSIS — N20.0 NEPHROLITHIASIS: Primary | ICD-10-CM

## 2020-03-05 DIAGNOSIS — N20.0 CALCULUS OF KIDNEY: ICD-10-CM

## 2020-03-05 PROBLEM — E55.9 VITAMIN D DEFICIENCY: Status: ACTIVE | Noted: 2018-01-30

## 2020-03-05 PROBLEM — G56.03 BILATERAL CARPAL TUNNEL SYNDROME: Status: ACTIVE | Noted: 2017-10-31

## 2020-03-05 PROBLEM — E66.01 MORBID OBESITY (HCC): Status: ACTIVE | Noted: 2017-10-04

## 2020-03-05 PROBLEM — Z98.890 S/P LEFT KNEE ARTHROSCOPY: Status: ACTIVE | Noted: 2017-10-04

## 2020-03-05 PROBLEM — M79.673 HEEL PAIN: Status: ACTIVE | Noted: 2020-03-05

## 2020-03-05 PROBLEM — R73.03 PREDIABETES: Status: ACTIVE | Noted: 2018-01-30

## 2020-03-05 LAB
SL AMB  POCT GLUCOSE, UA: NORMAL
SL AMB LEUKOCYTE ESTERASE,UA: NORMAL
SL AMB POCT BILIRUBIN,UA: NORMAL
SL AMB POCT BLOOD,UA: NORMAL
SL AMB POCT CLARITY,UA: CLEAR
SL AMB POCT COLOR,UA: YELLOW
SL AMB POCT KETONES,UA: NORMAL
SL AMB POCT NITRITE,UA: NORMAL
SL AMB POCT PH,UA: 5
SL AMB POCT SPECIFIC GRAVITY,UA: 1.03
SL AMB POCT URINE PROTEIN: NORMAL
SL AMB POCT UROBILINOGEN: 0.2

## 2020-03-05 PROCEDURE — 99244 OFF/OP CNSLTJ NEW/EST MOD 40: CPT | Performed by: UROLOGY

## 2020-03-05 PROCEDURE — 81002 URINALYSIS NONAUTO W/O SCOPE: CPT | Performed by: UROLOGY

## 2020-03-05 RX ORDER — IBUPROFEN 200 MG
1 CAPSULE ORAL 3 TIMES DAILY
Qty: 90 TABLET | Refills: 6 | Status: SHIPPED | OUTPATIENT
Start: 2020-03-05 | End: 2021-05-05

## 2020-03-05 RX ORDER — LEVOFLOXACIN 5 MG/ML
750 INJECTION, SOLUTION INTRAVENOUS ONCE
Status: CANCELLED | OUTPATIENT
Start: 2020-03-05 | End: 2020-03-05

## 2020-03-05 NOTE — PATIENT INSTRUCTIONS
Ureteroscopy   WHAT YOU NEED TO KNOW:   A ureteroscopy is a procedure to examine in the inside of your urinary tract, which includes your urethra, bladder, ureters, and kidneys  A ureteroscope is a small, thin tube with a light and camera on the end  Ureteroscopy can help your healthcare provider diagnose and treat problems in your urinary tract, such as kidney stones  HOW TO PREPARE:   The week before your procedure:   · Write down the correct date, time, and location of your procedure  · Ask your caregiver if you need to stop using aspirin or any other prescribed or over-the-counter medicine before your procedure or surgery  · Bring your medicine bottles or a list of your medicines when you see your caregiver  Tell your caregiver if you are allergic to any medicine  Tell your caregiver if you use any herbs, food supplements, or over-the-counter medicine  · Arrange a ride home  Ask a family member or friend to drive you home after your surgery or procedure  Do not drive yourself home  · You may need blood or urine tests before your procedure  You may also need an EKG  Ask your healthcare provider for more information about these and other tests that you may need  Write down the date, time, and location of each test   The night before your procedure:  Ask caregivers about directions for eating and drinking  The day of your procedure:   · Ask your caregiver before taking any medicine on the day of your procedure  These medicines include insulin, diabetic pills, high blood pressure pills, or heart pills  Bring a list of all the medicines you take, or your pill bottles, with you to the hospital     · You or a close family member will be asked to sign a legal document called a consent form  It gives caregivers permission to do the procedure or surgery  It also explains the problems that may happen, and your choices  Make sure all your questions are answered before you sign this form      · An anesthesiologist will talk to you before your surgery  You may need medicine to keep you asleep or numb an area of your body during surgery  Tell caregivers if you or anyone in your family has had a problem with anesthesia in the past     · Caregivers may insert an intravenous tube (IV) into your vein  A vein in the arm is usually chosen  Through the IV tube, you may be given liquids and medicine  WHAT WILL HAPPEN:   What will happen: Your healthcare provider will place the ureteroscope into your urethra  He will pass it through your bladder and into your ureters and kidneys  Your healthcare provider may place tools through the scope that will help him remove tissue or stones  The tools may also help him place stents or sheaths to help keep your ureters open  After your procedure: You will be taken to a room to rest until you are fully awake  Healthcare providers will monitor you closely for any problems  Do not get out of bed until your healthcare provider says it is okay  When your healthcare provider sees that you are okay, you will be able to go home or be taken to your hospital room  CONTACT YOUR HEALTHCARE PROVIDER IF:   · You cannot make it to your procedure  · You have a fever  · You get a cold or the flu  · You have questions or concerns about your procedure  SEEK CARE IMMEDIATELY IF:   · Your symptoms get worse  RISKS:   You may bleed more than expected or get an infection  One of your ureters may be injured  You may have a blockage in one of your ureters  You may need another procedure or surgery  CARE AGREEMENT:   You have the right to help plan your care  Learn about your health condition and how it may be treated  Discuss treatment options with your caregivers to decide what care you want to receive  You always have the right to refuse treatment     © 2017 [employer] Information is for End User's use only and may not be sold, redistributed or otherwise used for commercial purposes  All illustrations and images included in CareNotes® are the copyrighted property of A D A M , Inc  or [employer]aton  The above information is an  only  It is not intended as medical advice for individual conditions or treatments  Talk to your doctor, nurse or pharmacist before following any medical regimen to see if it is safe and effective for you

## 2020-03-06 ENCOUNTER — OFFICE VISIT (OUTPATIENT)
Dept: FAMILY MEDICINE CLINIC | Facility: CLINIC | Age: 35
End: 2020-03-06
Payer: COMMERCIAL

## 2020-03-06 VITALS
BODY MASS INDEX: 40.9 KG/M2 | TEMPERATURE: 99.1 F | HEART RATE: 75 BPM | SYSTOLIC BLOOD PRESSURE: 140 MMHG | DIASTOLIC BLOOD PRESSURE: 90 MMHG | HEIGHT: 72 IN | OXYGEN SATURATION: 96 % | WEIGHT: 302 LBS

## 2020-03-06 DIAGNOSIS — Z13.220 SCREENING, LIPID: ICD-10-CM

## 2020-03-06 DIAGNOSIS — Z98.84 BARIATRIC SURGERY STATUS: Primary | ICD-10-CM

## 2020-03-06 DIAGNOSIS — Z11.4 SCREENING FOR HIV (HUMAN IMMUNODEFICIENCY VIRUS): ICD-10-CM

## 2020-03-06 DIAGNOSIS — Z13.1 SCREENING FOR DIABETES MELLITUS: ICD-10-CM

## 2020-03-06 PROBLEM — G56.03 BILATERAL CARPAL TUNNEL SYNDROME: Status: RESOLVED | Noted: 2017-10-31 | Resolved: 2020-03-06

## 2020-03-06 PROBLEM — N20.0 CALCULUS OF KIDNEY: Status: ACTIVE | Noted: 2020-03-06

## 2020-03-06 PROBLEM — R73.03 PREDIABETES: Status: RESOLVED | Noted: 2018-01-30 | Resolved: 2020-03-06

## 2020-03-06 PROBLEM — M79.673 HEEL PAIN: Status: RESOLVED | Noted: 2020-03-05 | Resolved: 2020-03-06

## 2020-03-06 PROBLEM — N20.0 NEPHROLITHIASIS: Status: ACTIVE | Noted: 2020-03-06

## 2020-03-06 PROCEDURE — 99204 OFFICE O/P NEW MOD 45 MIN: CPT | Performed by: FAMILY MEDICINE

## 2020-03-06 PROCEDURE — 1036F TOBACCO NON-USER: CPT | Performed by: FAMILY MEDICINE

## 2020-03-06 PROCEDURE — 3008F BODY MASS INDEX DOCD: CPT | Performed by: FAMILY MEDICINE

## 2020-03-06 RX ORDER — MULTIVITAMIN
1 TABLET ORAL DAILY
COMMUNITY
End: 2021-11-12

## 2020-03-06 NOTE — TELEPHONE ENCOUNTER
Patient called back and I scheduled him for 4/7/20 with Dr Matteo Rolon at [facility]  Patient is aware he needs to have a urine culture prior to surgery  I am mailing him his paperwork today

## 2020-03-06 NOTE — PROGRESS NOTES
Myra Gomez 1985 male MRN: [id number]      ASSESSMENT/PLAN  Problem List Items Addressed This Visit        Other    Bariatric surgery status - Primary    Relevant Orders    Ambulatory referral to Weight Management    Vitamin D 25 hydroxy    Methylmalonic acid, serum    Ferritin    Folate    Vitamin A    Vitamin B1, whole blood      Other Visit Diagnoses     Screening, lipid        Relevant Orders    Lipid panel    Screening for diabetes mellitus        Relevant Orders    Comprehensive metabolic panel    Screening for HIV (human immunodeficiency virus)        Relevant Orders    HIV 1/2 AG-AB combo        Kidney stone: F/u with Urology as scheduled  Elevated BP: Some elevation likely secondary to pain from kidney stone burden as well as pt's high caffeine intake  Encouraged pt to continue cessation of caffeine and check his BP 3x per week  RTC in 2 weeks to follow up BP  CMP + Lipids to screen for HLD, DM  HIV screening ordered, pt agreeable  BMI Counseling: Body mass index is 40 96 kg/m²  The BMI is above normal  Patient referred to weight management due to patient being obese  Will refer to our Bariatric department to establish care and take over monitoring of his sleeve/weight loss  Will check vitamin levels  No future appointments  SUBJECTIVE  CC: Establish Care (Patient seen in office today for a new patient to establish care - ) and Hypertension      HPI:  Myra Gomez is a 38 y o  male who presents with his son to establish care  History reviewed and updated as below  Pt was seen in [facility] ED on 3/4 due to R-sided flank pain and was found to have a non-obstructing 13 mm calculus in the R lower pole  He was given pain medication and fluids, and discharged to home  He was seen by Urology yesterday, with a plan for intervention on 4/7  Voiding well, without pain  Does have an "occasional throb" in his kidney  Stop drinking iced tea   Is currently on calcium carbonate but is switching to citrate  Has noted some high blood pressures recently:   130-140s/80s   170-190/90-100s (at highest, was during stone as above)   He has not previously had elevated BP   He does not that he was previously drinking a great deal of iced tea (as above) and thinks this may be playing a role  He does not eat out or eat a ton of processed foods  His family uses a meal delivery program      Pt is s/p gastric sleeve almost 2 years ago  He lost approximately 100 pounds, but has since gained 30 back  He has not been able to schedule a follow up with his surgeon, as they keep rescheduling on him  He has not had any follow up blood work done in 2 years  Review of Systems   Constitutional: Negative for unexpected weight change  HENT: Negative for congestion, ear pain, rhinorrhea and sore throat  Eyes: Negative for visual disturbance  Respiratory: Negative for shortness of breath  Cardiovascular: Negative for chest pain, palpitations and leg swelling  Gastrointestinal: Negative for abdominal pain, constipation and diarrhea  Endocrine: Negative for polyuria  Genitourinary: Negative for dysuria  Neurological: Negative for dizziness and light-headedness  Psychiatric/Behavioral: Negative for sleep disturbance         Historical Information   The patient history was reviewed and updated as follows:    Past Medical History:   Diagnosis Date    Cerumen debris on tympanic membrane, right 10/23/2016     Past Surgical History:   Procedure Laterality Date    ABDOMINAL SURGERY      gastric sleeve 5/2018    KNEE SURGERY Left      Family History   Problem Relation Age of Onset    No Known Problems Mother     Hypertension Father     Diabetes Father     Coronary artery disease Father       Social History   Social History     Substance and Sexual Activity   Alcohol Use Yes    Frequency: Monthly or less     Social History     Substance and Sexual Activity   Drug Use Not Currently Social History     Tobacco Use   Smoking Status Never Smoker   Smokeless Tobacco Never Used       Medications:     Current Outpatient Medications:     calcium citrate (CALCITRATE) 950 MG tablet, Take 1 tablet (950 mg total) by mouth 3 (three) times a day, Disp: 90 tablet, Rfl: 6    Cyanocobalamin (VITAMIN B 12 PO), Take by mouth, Disp: , Rfl:     Multiple Vitamin (MULTIVITAMIN) tablet, Take 1 tablet by mouth daily, Disp: , Rfl:     naproxen (NAPROSYN) 500 mg tablet, Take 1 tablet (500 mg total) by mouth 2 (two) times a day with meals, Disp: 30 tablet, Rfl: 0    Omeprazole 20 MG TBDD, Take 20 mg by mouth daily, Disp: , Rfl:     tamsulosin (FLOMAX) 0 4 mg, Take 1 capsule (0 4 mg total) by mouth daily with dinner, Disp: 10 capsule, Rfl: 0  Allergies   Allergen Reactions    Penicillins Anaphylaxis    Meloxicam Rash       OBJECTIVE    Vitals:   Vitals:    03/06/20 1040   BP: 140/90   Pulse: 75   Temp: 99 1 °F (37 3 °C)   SpO2: 96%   Weight: (!) 137 kg (302 lb)   Height: 6' (1 829 m)           Physical Exam   Constitutional: He appears well-developed and well-nourished  No distress  HENT:   Head: Normocephalic and atraumatic  Right Ear: External ear normal    Left Ear: External ear normal    Nose: Nose normal    Mouth/Throat: Oropharynx is clear and moist    Eyes: Conjunctivae are normal    Neck: No thyromegaly present  Cardiovascular: Normal rate and regular rhythm  Pulmonary/Chest: Effort normal and breath sounds normal  No respiratory distress  Abdominal: Soft  Bowel sounds are normal  He exhibits no distension  There is no tenderness  Musculoskeletal: He exhibits no edema  Lymphadenopathy:     He has no cervical adenopathy  Neurological: He is alert  Skin: Skin is warm and dry  Psychiatric: He has a normal mood and affect  Vitals reviewed                   DO [facility] [address] [facility]   3/6/2020  11:06 AM

## 2020-03-12 ENCOUNTER — APPOINTMENT (OUTPATIENT)
Dept: LAB | Facility: CLINIC | Age: 35
End: 2020-03-12
Payer: COMMERCIAL

## 2020-03-12 DIAGNOSIS — Z98.84 BARIATRIC SURGERY STATUS: ICD-10-CM

## 2020-03-12 DIAGNOSIS — Z13.1 SCREENING FOR DIABETES MELLITUS: ICD-10-CM

## 2020-03-12 DIAGNOSIS — Z11.4 SCREENING FOR HIV (HUMAN IMMUNODEFICIENCY VIRUS): ICD-10-CM

## 2020-03-12 DIAGNOSIS — N20.0 CALCULUS OF KIDNEY: ICD-10-CM

## 2020-03-12 DIAGNOSIS — Z13.220 SCREENING, LIPID: ICD-10-CM

## 2020-03-12 LAB
25(OH)D3 SERPL-MCNC: 38.2 NG/ML (ref 30–100)
ALBUMIN SERPL BCP-MCNC: 3.8 G/DL (ref 3.5–5)
ALP SERPL-CCNC: 82 U/L (ref 46–116)
ALT SERPL W P-5'-P-CCNC: 18 U/L (ref 12–78)
ANION GAP SERPL CALCULATED.3IONS-SCNC: 4 MMOL/L (ref 4–13)
AST SERPL W P-5'-P-CCNC: 13 U/L (ref 5–45)
BILIRUB SERPL-MCNC: 0.72 MG/DL (ref 0.2–1)
BUN SERPL-MCNC: 15 MG/DL (ref 5–25)
CALCIUM SERPL-MCNC: 9.1 MG/DL (ref 8.3–10.1)
CHLORIDE SERPL-SCNC: 107 MMOL/L (ref 100–108)
CHOLEST SERPL-MCNC: 129 MG/DL (ref 50–200)
CO2 SERPL-SCNC: 29 MMOL/L (ref 21–32)
CREAT SERPL-MCNC: 0.8 MG/DL (ref 0.6–1.3)
FERRITIN SERPL-MCNC: 39 NG/ML (ref 8–388)
FOLATE SERPL-MCNC: >20 NG/ML (ref 3.1–17.5)
GFR SERPL CREATININE-BSD FRML MDRD: 114 ML/MIN/1.73SQ M
GLUCOSE P FAST SERPL-MCNC: 88 MG/DL (ref 65–99)
HDLC SERPL-MCNC: 37 MG/DL
LDLC SERPL CALC-MCNC: 79 MG/DL (ref 0–100)
NONHDLC SERPL-MCNC: 92 MG/DL
POTASSIUM SERPL-SCNC: 4.2 MMOL/L (ref 3.5–5.3)
PROT SERPL-MCNC: 6.9 G/DL (ref 6.4–8.2)
SODIUM SERPL-SCNC: 140 MMOL/L (ref 136–145)
TRIGL SERPL-MCNC: 67 MG/DL

## 2020-03-12 PROCEDURE — 87086 URINE CULTURE/COLONY COUNT: CPT

## 2020-03-12 PROCEDURE — 84590 ASSAY OF VITAMIN A: CPT

## 2020-03-12 PROCEDURE — 80053 COMPREHEN METABOLIC PANEL: CPT

## 2020-03-12 PROCEDURE — 84425 ASSAY OF VITAMIN B-1: CPT

## 2020-03-12 PROCEDURE — 82746 ASSAY OF FOLIC ACID SERUM: CPT

## 2020-03-12 PROCEDURE — 82306 VITAMIN D 25 HYDROXY: CPT

## 2020-03-12 PROCEDURE — 82728 ASSAY OF FERRITIN: CPT

## 2020-03-12 PROCEDURE — 87389 HIV-1 AG W/HIV-1&-2 AB AG IA: CPT

## 2020-03-12 PROCEDURE — 80061 LIPID PANEL: CPT

## 2020-03-12 PROCEDURE — 83918 ORGANIC ACIDS TOTAL QUANT: CPT

## 2020-03-12 PROCEDURE — 36415 COLL VENOUS BLD VENIPUNCTURE: CPT

## 2020-03-13 LAB
BACTERIA UR CULT: NORMAL
HIV 1+2 AB+HIV1 P24 AG SERPL QL IA: NORMAL

## 2020-03-14 LAB
METHYLMALONATE SERPL-SCNC: 115 NMOL/L (ref 0–378)
SL AMB DISCLAIMER: NORMAL

## 2020-03-15 ENCOUNTER — HOSPITAL ENCOUNTER (EMERGENCY)
Facility: HOSPITAL | Age: 35
Discharge: HOME | End: 2020-03-15
Attending: EMERGENCY MEDICINE | Admitting: EMERGENCY MEDICINE
Payer: COMMERCIAL

## 2020-03-15 VITALS
WEIGHT: 300.27 LBS | RESPIRATION RATE: 18 BRPM | HEART RATE: 62 BPM | TEMPERATURE: 98.1 F | BODY MASS INDEX: 40.67 KG/M2 | OXYGEN SATURATION: 97 % | DIASTOLIC BLOOD PRESSURE: 72 MMHG | HEIGHT: 72 IN | SYSTOLIC BLOOD PRESSURE: 133 MMHG

## 2020-03-15 DIAGNOSIS — I10 HYPERTENSION: Primary | ICD-10-CM

## 2020-03-15 LAB
ANION GAP SERPL CALCULATED.3IONS-SCNC: 9 MMOL/L (ref 4–13)
BASOPHILS # BLD AUTO: 0.03 THOUSANDS/ΜL (ref 0–0.1)
BASOPHILS NFR BLD AUTO: 1 % (ref 0–1)
BUN SERPL-MCNC: 15 MG/DL (ref 5–25)
CALCIUM SERPL-MCNC: 9 MG/DL (ref 8.3–10.1)
CHLORIDE SERPL-SCNC: 103 MMOL/L (ref 100–108)
CO2 SERPL-SCNC: 27 MMOL/L (ref 21–32)
CREAT SERPL-MCNC: 0.73 MG/DL (ref 0.6–1.3)
EOSINOPHIL # BLD AUTO: 0.16 THOUSAND/ΜL (ref 0–0.61)
EOSINOPHIL NFR BLD AUTO: 2 % (ref 0–6)
ERYTHROCYTE [DISTWIDTH] IN BLOOD BY AUTOMATED COUNT: 13 % (ref 11.6–15.1)
GFR SERPL CREATININE-BSD FRML MDRD: 118 ML/MIN/1.73SQ M
GLUCOSE SERPL-MCNC: 102 MG/DL (ref 65–140)
HCT VFR BLD AUTO: 45.4 % (ref 36.5–49.3)
HGB BLD-MCNC: 14.7 G/DL (ref 12–17)
IMM GRANULOCYTES # BLD AUTO: 0.02 THOUSAND/UL (ref 0–0.2)
IMM GRANULOCYTES NFR BLD AUTO: 0 % (ref 0–2)
LYMPHOCYTES # BLD AUTO: 2.22 THOUSANDS/ΜL (ref 0.6–4.47)
LYMPHOCYTES NFR BLD AUTO: 34 % (ref 14–44)
MCH RBC QN AUTO: 29.3 PG (ref 26.8–34.3)
MCHC RBC AUTO-ENTMCNC: 32.4 G/DL (ref 31.4–37.4)
MCV RBC AUTO: 90 FL (ref 82–98)
MONOCYTES # BLD AUTO: 0.45 THOUSAND/ΜL (ref 0.17–1.22)
MONOCYTES NFR BLD AUTO: 7 % (ref 4–12)
NEUTROPHILS # BLD AUTO: 3.7 THOUSANDS/ΜL (ref 1.85–7.62)
NEUTS SEG NFR BLD AUTO: 56 % (ref 43–75)
NRBC BLD AUTO-RTO: 0 /100 WBCS
NT-PROBNP SERPL-MCNC: 9 PG/ML
PLATELET # BLD AUTO: 205 THOUSANDS/UL (ref 149–390)
PMV BLD AUTO: 10.1 FL (ref 8.9–12.7)
POTASSIUM SERPL-SCNC: 3.7 MMOL/L (ref 3.5–5.3)
RBC # BLD AUTO: 5.02 MILLION/UL (ref 3.88–5.62)
SODIUM SERPL-SCNC: 139 MMOL/L (ref 136–145)
TROPONIN I SERPL-MCNC: <0.02 NG/ML
WBC # BLD AUTO: 6.58 THOUSAND/UL (ref 4.31–10.16)

## 2020-03-15 PROCEDURE — 85025 COMPLETE CBC W/AUTO DIFF WBC: CPT | Performed by: EMERGENCY MEDICINE

## 2020-03-15 PROCEDURE — 80048 BASIC METABOLIC PNL TOTAL CA: CPT | Performed by: EMERGENCY MEDICINE

## 2020-03-15 PROCEDURE — 99283 EMERGENCY DEPT VISIT LOW MDM: CPT

## 2020-03-15 PROCEDURE — 83880 ASSAY OF NATRIURETIC PEPTIDE: CPT | Performed by: EMERGENCY MEDICINE

## 2020-03-15 PROCEDURE — 84484 ASSAY OF TROPONIN QUANT: CPT | Performed by: EMERGENCY MEDICINE

## 2020-03-15 PROCEDURE — 93005 ELECTROCARDIOGRAM TRACING: CPT

## 2020-03-15 PROCEDURE — 99283 EMERGENCY DEPT VISIT LOW MDM: CPT | Performed by: EMERGENCY MEDICINE

## 2020-03-15 PROCEDURE — 36415 COLL VENOUS BLD VENIPUNCTURE: CPT | Performed by: EMERGENCY MEDICINE

## 2020-03-15 RX ORDER — AMLODIPINE BESYLATE 5 MG/1
5 TABLET ORAL DAILY
Qty: 14 TABLET | Refills: 0 | Status: SHIPPED | OUTPATIENT
Start: 2020-03-15 | End: 2020-03-26 | Stop reason: SDUPTHER

## 2020-03-15 RX ORDER — AMLODIPINE BESYLATE 5 MG/1
5 TABLET ORAL ONCE
Status: COMPLETED | OUTPATIENT
Start: 2020-03-15 | End: 2020-03-15

## 2020-03-15 RX ADMIN — AMLODIPINE BESYLATE 5 MG: 5 TABLET ORAL at 19:25

## 2020-03-15 NOTE — ED PROVIDER NOTES
History  Chief Complaint   Patient presents with    Hypertension     patient reports having trouble controling his blood for the last month  [facility]  [facility]  38 y o  M presents to the ED with elevated BP x one month  Has not seen PCP  Has been seen by urgent care  No medications started as of yet  C/o BP, highest he has seen over the past month is 186/122, but presents here 138/82, in NAD  HE denies CP, denies leg swelling, no SOB  Admits to occasional mild headache (no acute onset), no focal neuro deficits, nausea without vomiting  No change in urine habits  No abdominal pain  Prior to Admission Medications   Prescriptions Last Dose Informant Patient Reported? Taking?    Cyanocobalamin (VITAMIN B 12 PO)  Self Yes No   Sig: Take by mouth   Multiple Vitamin (MULTIVITAMIN) tablet  Self Yes No   Sig: Take 1 tablet by mouth daily   Omeprazole 20 MG TBDD  Self Yes No   Sig: Take 20 mg by mouth daily   calcium citrate (CALCITRATE) 950 MG tablet   No No   Sig: Take 1 tablet (950 mg total) by mouth 3 (three) times a day   naproxen (NAPROSYN) 500 mg tablet  Self No No   Sig: Take 1 tablet (500 mg total) by mouth 2 (two) times a day with meals   tamsulosin (FLOMAX) 0 4 mg  Self No No   Sig: Take 1 capsule (0 4 mg total) by mouth daily with dinner      Facility-Administered Medications: None       Past Medical History:   Diagnosis Date    Bilateral carpal tunnel syndrome 10/31/2017    Cerumen debris on tympanic membrane, right 10/23/2016    Obstructive sleep apnea on CPAP 8/21/2012    Added automatically from request for surgery [id number]    Plantar fasciitis of right foot 4/11/2013    Prediabetes 1/30/2018    Added automatically from request for surgery [id number]       Past Surgical History:   Procedure Laterality Date    ABDOMINAL SURGERY      gastric sleeve 5/2018    KNEE SURGERY Left        Family History   Problem Relation Age of Onset    No Known Problems Mother     Hypertension Father     Diabetes Father     Coronary artery disease Father      I have reviewed and agree with the history as documented  E-Cigarette/Vaping    E-Cigarette Use Never User      E-Cigarette/Vaping Substances     Social History     Tobacco Use    Smoking status: Never Smoker    Smokeless tobacco: Never Used   Substance Use Topics    Alcohol use: Yes     Frequency: Monthly or less    Drug use: Not Currently       Review of Systems   Constitutional: Negative for chills, fatigue and fever  HENT: Negative for congestion and rhinorrhea  Respiratory: Negative for chest tightness and shortness of breath  Cardiovascular: Negative for chest pain and leg swelling  Gastrointestinal: Negative for abdominal pain, nausea and vomiting  Genitourinary: Negative for dysuria and flank pain  Musculoskeletal: Negative for back pain and neck pain  Skin: Negative for rash and wound  Neurological: Negative for dizziness, seizures, syncope, facial asymmetry, weakness, light-headedness, numbness and headaches  Physical Exam  Physical Exam   Constitutional: He is oriented to person, place, and time  He appears well-developed and well-nourished  No distress  HENT:   Head: Normocephalic and atraumatic  Mouth/Throat: Oropharynx is clear and moist    Eyes: Pupils are equal, round, and reactive to light  Conjunctivae and EOM are normal    Neck: Normal range of motion  Neck supple  Cardiovascular: Normal rate and regular rhythm  No murmur heard  Pulmonary/Chest: Effort normal and breath sounds normal  No respiratory distress  He has no wheezes  He exhibits no tenderness  Abdominal: Soft  There is no tenderness  Musculoskeletal: Normal range of motion  Neurological: He is alert and oriented to person, place, and time  No cranial nerve deficit or sensory deficit  Normal strength in upper and lower extremities  Normal gait    No evidence of focal neurologic deficit  Skin: Skin is warm and dry  He is not diaphoretic  No pallor  Psychiatric: He has a normal mood and affect  His behavior is normal    Vitals reviewed        Vital Signs  ED Triage Vitals   Temperature Pulse Respirations Blood Pressure SpO2   03/15/20 1801 03/15/20 1801 03/15/20 1801 03/15/20 1802 03/15/20 1801   98 1 °F (36 7 °C) 65 18 138/82 97 %      Temp Source Heart Rate Source Patient Position - Orthostatic VS BP Location FiO2 (%)   03/15/20 1801 03/15/20 1801 -- -- --   Oral Monitor         Pain Score       --                  Vitals:    03/15/20 1801 03/15/20 1802 03/15/20 1927   BP:  138/82 133/72   Pulse: 65  62         Visual Acuity      ED Medications  Medications   amLODIPine (NORVASC) tablet 5 mg (5 mg Oral Given 3/15/20 1925)       Diagnostic Studies  Results Reviewed     Procedure Component Value Units Date/Time    Basic metabolic panel [[id number]] Collected:  03/15/20 1843    Lab Status:  Final result Specimen:  Blood from Arm, Left Updated:  03/15/20 1919     Sodium 139 mmol/L      Potassium 3 7 mmol/L      Chloride 103 mmol/L      CO2 27 mmol/L      ANION GAP 9 mmol/L      BUN 15 mg/dL      Creatinine 0 73 mg/dL      Glucose 102 mg/dL      Calcium 9 0 mg/dL      eGFR 118 ml/min/1 73sq m     Narrative:       [place] guidelines for Chronic Kidney Disease (CKD):     Stage 1 with normal or high GFR (GFR > 90 mL/min/1 73 square meters)    Stage 2 Mild CKD (GFR = 60-89 mL/min/1 73 square meters)    Stage 3A Moderate CKD (GFR = 45-59 mL/min/1 73 square meters)    Stage 3B Moderate CKD (GFR = 30-44 mL/min/1 73 square meters)    Stage 4 Severe CKD (GFR = 15-29 mL/min/1 73 square meters)    Stage 5 End Stage CKD (GFR <15 mL/min/1 73 square meters)  Note: GFR calculation is accurate only with a steady state creatinine    NT-BNP PRO [[id number]]  (Normal) Collected:  03/15/20 1843    Lab Status:  Final result Specimen:  Blood from Arm, Left Updated:  03/15/20 1919 NT-proBNP 9 pg/mL     Troponin I [[id number]]  (Normal) Collected:  03/15/20 1843    Lab Status:  Final result Specimen:  Blood from Arm, Left Updated:  03/15/20 1916     Troponin I <0 02 ng/mL     CBC and differential [[id number]] Collected:  03/15/20 1843    Lab Status:  Final result Specimen:  Blood from Arm, Left Updated:  03/15/20 1854     WBC 6 58 Thousand/uL      RBC 5 02 Million/uL      Hemoglobin 14 7 g/dL      Hematocrit 45 4 %      MCV 90 fL      MCH 29 3 pg      MCHC 32 4 g/dL      RDW 13 0 %      MPV 10 1 fL      Platelets 105 Thousands/uL      nRBC 0 /100 WBCs      Neutrophils Relative 56 %      Immat GRANS % 0 %      Lymphocytes Relative 34 %      Monocytes Relative 7 %      Eosinophils Relative 2 %      Basophils Relative 1 %      Neutrophils Absolute 3 70 Thousands/µL      Immature Grans Absolute 0 02 Thousand/uL      Lymphocytes Absolute 2 22 Thousands/µL      Monocytes Absolute 0 45 Thousand/µL      Eosinophils Absolute 0 16 Thousand/µL      Basophils Absolute 0 03 Thousands/µL                  No orders to display              Procedures  Procedures   EKG reviewed, no STEMI  ED Course  ED Course as of Mar 15 2202   Burma Goldberg Mar 15, 2020   1906 EKG reviewed  No STEMI  1933 NT-proBNP: 9   1933 Troponin I: <0 02   1933 Potassium: 3 7                                 MDM  Number of Diagnoses or Management Options  Hypertension:   Diagnosis management comments: Hypertension, no evidence of end-organ damage  This patient does not require emergency care  He can be discharged home, started on Norvasc, he is advised to follow up with his primary care provider for medical care for hypertension and for continued blood pressure management  Advised of reasons to return to the emergency department including stroke-like symptoms, signs of cardiac disease, difficulty breathing, etcetera  Patient discharged in stable condition         Amount and/or Complexity of Data Reviewed  Clinical lab tests: ordered and reviewed          Disposition  Final diagnoses:   Hypertension     Time reflects when diagnosis was documented in both MDM as applicable and the Disposition within this note     Time User Action Codes Description Comment    3/15/2020  7:36 PM MadhuEneidachaz Hypertension       ED Disposition     ED Disposition Condition Date/Time Comment    Discharge Stable Sun Mar 15, 2020  7:36 PM Alsey Blocker discharge to home/self care              Follow-up Information     Follow up With Specialties Details Why Contact Info Additional Information    Gina Bowser DO Family Medicine Schedule an appointment as soon as possible for a visit  as soon as possible for BP check and medication management [address] [facility] Emergency Department Emergency Medicine  if you have severe symptoms: chest pain, acute onset headache, weakness/numbness in your extremities, etc [address] [facility] [address] [phone] [place] ED, [address], [address]          Discharge Medication List as of 3/15/2020  7:48 PM      START taking these medications    Details   amLODIPine (NORVASC) 5 mg tablet Take 1 tablet (5 mg total) by mouth daily for 14 days, Starting Sun 3/15/2020, Until Sun 3/29/2020, Print         CONTINUE these medications which have NOT CHANGED    Details   calcium citrate (CALCITRATE) 950 MG tablet Take 1 tablet (950 mg total) by mouth 3 (three) times a day, Starting Thu 3/5/2020, Normal      Cyanocobalamin (VITAMIN B 12 PO) Take by mouth, Historical Med      Multiple Vitamin (MULTIVITAMIN) tablet Take 1 tablet by mouth daily, Historical Med      naproxen (NAPROSYN) 500 mg tablet Take 1 tablet (500 mg total) by mouth 2 (two) times a day with meals, Starting Wed 3/4/2020, Normal      Omeprazole 20 MG TBDD Take 20 mg by mouth daily, Starting Fri 5/10/2019, Until Sat 5/9/2020, Historical Med      tamsulosin (FLOMAX) 0 4 mg Take 1 capsule (0 4 mg total) by mouth daily with dinner, Starting Wed 3/4/2020, Normal           No discharge procedures on file      PDMP Review     None          ED Provider  Electronically Signed by           Rossy Garcia DO  03/15/20 3169

## 2020-03-15 NOTE — DISCHARGE INSTRUCTIONS
BP must be monitored and followed by PCP  Any need for medication changes must be through PCP        Labwork is stable here, without evidence of damage to your heart, kidneys, etc

## 2020-03-16 LAB
ATRIAL RATE: 55 BPM
P AXIS: 38 DEGREES
PR INTERVAL: 170 MS
QRS AXIS: -38 DEGREES
QRSD INTERVAL: 114 MS
QT INTERVAL: 404 MS
QTC INTERVAL: 386 MS
T WAVE AXIS: 38 DEGREES
VENTRICULAR RATE: 55 BPM
VIT A SERPL-MCNC: 46 UG/DL (ref 18.9–57.3)
VIT B1 BLD-SCNC: 146.9 NMOL/L (ref 66.5–200)

## 2020-03-16 PROCEDURE — 93010 ELECTROCARDIOGRAM REPORT: CPT | Performed by: INTERNAL MEDICINE

## 2020-03-26 ENCOUNTER — TELEMEDICINE (OUTPATIENT)
Dept: FAMILY MEDICINE CLINIC | Facility: CLINIC | Age: 35
End: 2020-03-26
Payer: COMMERCIAL

## 2020-03-26 ENCOUNTER — TELEPHONE (OUTPATIENT)
Dept: FAMILY MEDICINE CLINIC | Facility: CLINIC | Age: 35
End: 2020-03-26

## 2020-03-26 VITALS — DIASTOLIC BLOOD PRESSURE: 77 MMHG | SYSTOLIC BLOOD PRESSURE: 135 MMHG

## 2020-03-26 DIAGNOSIS — I10 HYPERTENSION: ICD-10-CM

## 2020-03-26 PROCEDURE — 3078F DIAST BP <80 MM HG: CPT | Performed by: FAMILY MEDICINE

## 2020-03-26 PROCEDURE — 99212 OFFICE O/P EST SF 10 MIN: CPT | Performed by: FAMILY MEDICINE

## 2020-03-26 PROCEDURE — 3075F SYST BP GE 130 - 139MM HG: CPT | Performed by: FAMILY MEDICINE

## 2020-03-26 RX ORDER — AMLODIPINE BESYLATE 10 MG/1
10 TABLET ORAL DAILY
Qty: 90 TABLET | Refills: 1 | Status: SHIPPED | OUTPATIENT
Start: 2020-03-26 | End: 2020-09-24 | Stop reason: SDUPTHER

## 2020-03-26 NOTE — PROGRESS NOTES
Virtual Regular Visit    Problem List Items Addressed This Visit     None      Visit Diagnoses     Hypertension        Relevant Medications    amLODIPine (NORVASC) 10 mg tablet        As BPs are still somewhat above goal, will increase Amlodipine to 10 mg daily  Discussed that if pt notes too much of a decrease in BP either on readings or developing dizziness/lightheadeness, can cut back to 5 mg again  F/u in 2 weeks to you  Reason for visit is BP check  Encounter provider Shemar Chavez DO    Provider located at [place] [address]  [address] [place] [id number]      Recent Visits  No visits were found meeting these conditions  Showing recent visits within past 7 days and meeting all other requirements     Today's Visits  Date Type Provider Dept   03/26/20 Telephone [facility]ENCHO Garcia Pg   03/26/20 Telemedicine DO Gabriel Butcher   Showing today's visits and meeting all other requirements     Future Appointments  Date Type Provider Dept   03/26/20 Telephone CHENCHO Garcia Pg   Showing future appointments within next 150 days and meeting all other requirements        After connecting through [employer], the patient was identified by name and date of birth  Mary No was informed that this is a telemedicine visit and that the visit is being conducted through [employer] and patient was informed that this is not a secure, HIPAA-complaint platform  he agrees to proceed  which may not be secure and therefore, might not be HIPAA-compliant  My office door was closed  No one else was in the room  He acknowledged consent and understanding of privacy and security of the video platform  The patient has agreed to participate and understands they can discontinue the visit at any time      Xavier Rachel is a 38 y o  male:     BP Follow up: Established care on 3/6, at that time BP 140/90 in office  Pt noted BPs 130-140s/80s, though had some that were 170-190/90-100s, which were taken while pt was dealing with a kidney stone  After discussion of lifestyle modifications, pt was encouraged to monitor BP and follow up in 2 weeks  3/15: Pt seen at [facility] due to elevated BP concerns  At that time, BP was 138/82  Pt was initiated on Amlodipine 5 mg daily and encouraged to follow up with PCP  Today:   Home BPs: 138/100, 151/77, 144/75, 168/77, 135/77  Only drinking water   Monitoring salt intake         Past Medical History:   Diagnosis Date    Bilateral carpal tunnel syndrome 10/31/2017    Cerumen debris on tympanic membrane, right 10/23/2016    Obstructive sleep apnea on CPAP 8/21/2012    Added automatically from request for surgery [id number]    Plantar fasciitis of right foot 4/11/2013    Prediabetes 1/30/2018    Added automatically from request for surgery [id number]       Past Surgical History:   Procedure Laterality Date    ABDOMINAL SURGERY      gastric sleeve 5/2018    KNEE SURGERY Left        Current Outpatient Medications   Medication Sig Dispense Refill    amLODIPine (NORVASC) 10 mg tablet Take 1 tablet (10 mg total) by mouth daily 90 tablet 1    calcium citrate (CALCITRATE) 950 MG tablet Take 1 tablet (950 mg total) by mouth 3 (three) times a day 90 tablet 6    Cyanocobalamin (VITAMIN B 12 PO) Take by mouth      Multiple Vitamin (MULTIVITAMIN) tablet Take 1 tablet by mouth daily      naproxen (NAPROSYN) 500 mg tablet Take 1 tablet (500 mg total) by mouth 2 (two) times a day with meals 30 tablet 0    Omeprazole 20 MG TBDD Take 20 mg by mouth daily      tamsulosin (FLOMAX) 0 4 mg Take 1 capsule (0 4 mg total) by mouth daily with dinner 10 capsule 0     No current facility-administered medications for this visit  Allergies   Allergen Reactions    Penicillins Anaphylaxis    Meloxicam Rash       Review of Systems   Constitutional: Positive for fatigue  Respiratory: Negative for shortness of breath  Cardiovascular: Negative for chest pain  Neurological: Negative for dizziness and light-headedness  Physical Exam   Constitutional: He appears well-developed and well-nourished  HENT:   Head: Normocephalic and atraumatic  Pulmonary/Chest: Effort normal    Neurological: He is alert  Psychiatric: He has a normal mood and affect  I spent 6 minutes with the patient during this visit

## 2020-03-31 ENCOUNTER — TELEPHONE (OUTPATIENT)
Dept: UROLOGY | Facility: MEDICAL CENTER | Age: 35
End: 2020-03-31

## 2020-03-31 NOTE — TELEPHONE ENCOUNTER
Patient left voicemail that he wants his surgery canceled because of COVID-19  I called patient back to reschedule surgery and left him a message to call back to reschedule

## 2020-04-20 DIAGNOSIS — Z98.84 BARIATRIC SURGERY STATUS: Primary | ICD-10-CM

## 2020-07-31 ENCOUNTER — TELEPHONE (OUTPATIENT)
Dept: UROLOGY | Facility: CLINIC | Age: 35
End: 2020-07-31

## 2020-07-31 NOTE — TELEPHONE ENCOUNTER
Called and left message per communication consent  In message stated that per Grover Madrid he can do H&P the day of surgery he does not need an appointment prior  Patient is to call the office back with any questions or concerns  Office number was left in the message

## 2020-07-31 NOTE — LETTER
August 21, 2020     Patient: Brinda Narayanan   YOB: 1985   Date of Visit: 7/31/2020       To Whom it May Concern:    Brinda Narayanan is under my professional care  He was seen for a procedure on 7/31/2020  He may return to work on 8/25/2020 with no restrictions  If you have any questions or concerns, please don't hesitate to call           Sincerely,          Dr Manohar Gleason      CC: Employer

## 2020-07-31 NOTE — TELEPHONE ENCOUNTER
Pt showed to his 1:00 appt today in [place] , spoke w/ John Hunter and she asked to r/s   Patient was extremely upset and said he is canceling everything including his surgery    His surgery is 08/21/20 - where can we r/s him  He would be a double book

## 2020-08-07 ENCOUNTER — ANESTHESIA EVENT (OUTPATIENT)
Dept: PERIOP | Facility: AMBULARY SURGERY CENTER | Age: 35
End: 2020-08-07
Payer: COMMERCIAL

## 2020-08-13 DIAGNOSIS — Z11.59 SPECIAL SCREENING EXAMINATION FOR VIRAL DISEASE: ICD-10-CM

## 2020-08-13 PROCEDURE — U0003 INFECTIOUS AGENT DETECTION BY NUCLEIC ACID (DNA OR RNA); SEVERE ACUTE RESPIRATORY SYNDROME CORONAVIRUS 2 (SARS-COV-2) (CORONAVIRUS DISEASE [COVID-19]), AMPLIFIED PROBE TECHNIQUE, MAKING USE OF HIGH THROUGHPUT TECHNOLOGIES AS DESCRIBED BY CMS-2020-01-R: HCPCS

## 2020-08-14 LAB — SARS-COV-2 RNA SPEC QL NAA+PROBE: NOT DETECTED

## 2020-08-18 ENCOUNTER — APPOINTMENT (OUTPATIENT)
Dept: LAB | Age: 35
End: 2020-08-18
Payer: COMMERCIAL

## 2020-08-18 DIAGNOSIS — N20.0 KIDNEY STONE: ICD-10-CM

## 2020-08-18 PROCEDURE — 87086 URINE CULTURE/COLONY COUNT: CPT

## 2020-08-18 NOTE — PRE-PROCEDURE INSTRUCTIONS
Pre-Surgery Instructions:   Medication Instructions    amLODIPine (NORVASC) 10 mg tablet Instructed patient per Anesthesia Guidelines   calcium citrate (CALCITRATE) 950 MG tablet Instructed patient per Anesthesia Guidelines   Cyanocobalamin (VITAMIN B 12 PO) Instructed patient per Anesthesia Guidelines   Multiple Vitamin (MULTIVITAMIN) tablet Instructed patient per Anesthesia Guidelines   Omeprazole 20 MG TBDD Instructed patient per Anesthesia Guidelines      Pre-op medication, and showering instructions with antibacteral soap reviewed

## 2020-08-19 LAB — BACTERIA UR CULT: NORMAL

## 2020-08-20 NOTE — ANESTHESIA PREPROCEDURE EVALUATION
Procedure:  CYSTOSCOPY URETEROSCOPY WITH LITHOTRIPSY HOLMIUM LASER, RETROGRADE PYELOGRAM AND INSERTION STENT URETERAL (Right Bladder)    Relevant Problems   GI/HEPATIC   (+) Bariatric surgery status   (+) GERD (gastroesophageal reflux disease)      /RENAL   (+) Nephrolithiasis      MUSCULOSKELETAL   (+) Chronic low back pain      PULMONARY   (+) Sleep apnea (Patient symptoms improved since losing weight, but intraprocedureal findings suggest his sleep apnea is at least moderate if not severe )      Other   (+) Morbid obesity (HCC)        Physical Exam    Airway    Mallampati score: III  TM Distance: >3 FB  Neck ROM: full     Dental   No notable dental hx     Cardiovascular  Rhythm: regular, Rate: normal, Cardiovascular exam normal    Pulmonary  Pulmonary exam normal Breath sounds clear to auscultation,     Other Findings        Anesthesia Plan  ASA Score- 3     Anesthesia Type- general with ASA Monitors  Additional Monitors:   Airway Plan: LMA  Comment: Risks/benefits and alternatives discussed with patient including likely possibility of PONV and sore throat, as well as the rare possibilities of aspiration, dental/oropharyngeal/ocular injuries, or grave/life threatening anesthetic and surgical emergencies          Plan Factors-Exercise tolerance (METS): >4 METS  Patient summary reviewed  Patient instructed to abstain from smoking on day of procedure  Patient did not smoke on day of surgery  Induction- intravenous  Postoperative Plan- Plan for postoperative opioid use  Planned trial extubation    Informed Consent- Anesthetic plan and risks discussed with patient  I personally reviewed this patient with the CRNA  Discussed and agreed on the Anesthesia Plan with the CRNA  Cory Melendez

## 2020-08-21 ENCOUNTER — HOSPITAL ENCOUNTER (OUTPATIENT)
Facility: AMBULARY SURGERY CENTER | Age: 35
Setting detail: OUTPATIENT SURGERY
Discharge: HOME | End: 2020-08-21
Attending: UROLOGY | Admitting: UROLOGY
Payer: COMMERCIAL

## 2020-08-21 ENCOUNTER — PREP FOR PROCEDURE (OUTPATIENT)
Dept: UROLOGY | Facility: CLINIC | Age: 35
End: 2020-08-21

## 2020-08-21 ENCOUNTER — APPOINTMENT (OUTPATIENT)
Dept: RADIOLOGY | Facility: AMBULARY SURGERY CENTER | Age: 35
End: 2020-08-21
Payer: COMMERCIAL

## 2020-08-21 ENCOUNTER — ANESTHESIA (OUTPATIENT)
Dept: PERIOP | Facility: AMBULARY SURGERY CENTER | Age: 35
End: 2020-08-21
Payer: COMMERCIAL

## 2020-08-21 VITALS
SYSTOLIC BLOOD PRESSURE: 125 MMHG | HEART RATE: 69 BPM | WEIGHT: 300 LBS | BODY MASS INDEX: 40.63 KG/M2 | DIASTOLIC BLOOD PRESSURE: 62 MMHG | OXYGEN SATURATION: 95 % | RESPIRATION RATE: 18 BRPM | HEIGHT: 72 IN | TEMPERATURE: 97 F

## 2020-08-21 DIAGNOSIS — N20.0 NEPHROLITHIASIS: Primary | ICD-10-CM

## 2020-08-21 DIAGNOSIS — N20.0 RIGHT RENAL STONE: Primary | ICD-10-CM

## 2020-08-21 PROCEDURE — 52356 CYSTO/URETERO W/LITHOTRIPSY: CPT | Performed by: UROLOGY

## 2020-08-21 PROCEDURE — C2617 STENT, NON-COR, TEM W/O DEL: HCPCS | Performed by: UROLOGY

## 2020-08-21 PROCEDURE — C1769 GUIDE WIRE: HCPCS | Performed by: UROLOGY

## 2020-08-21 PROCEDURE — 74420 UROGRAPHY RTRGR +-KUB: CPT

## 2020-08-21 PROCEDURE — NC001 PR NO CHARGE: Performed by: UROLOGY

## 2020-08-21 PROCEDURE — C1894 INTRO/SHEATH, NON-LASER: HCPCS | Performed by: UROLOGY

## 2020-08-21 PROCEDURE — C1758 CATHETER, URETERAL: HCPCS | Performed by: UROLOGY

## 2020-08-21 DEVICE — STENT URETERAL 6 FR 26CM INLAY OPTIMA
Type: IMPLANTABLE DEVICE | Status: NON-FUNCTIONAL
Removed: 2020-09-01

## 2020-08-21 RX ORDER — ONDANSETRON 2 MG/ML
INJECTION INTRAMUSCULAR; INTRAVENOUS AS NEEDED
Status: DISCONTINUED | OUTPATIENT
Start: 2020-08-21 | End: 2020-08-21

## 2020-08-21 RX ORDER — KETOROLAC TROMETHAMINE 30 MG/ML
INJECTION, SOLUTION INTRAMUSCULAR; INTRAVENOUS AS NEEDED
Status: DISCONTINUED | OUTPATIENT
Start: 2020-08-21 | End: 2020-08-21

## 2020-08-21 RX ORDER — DEXAMETHASONE SODIUM PHOSPHATE 10 MG/ML
INJECTION, SOLUTION INTRAMUSCULAR; INTRAVENOUS AS NEEDED
Status: DISCONTINUED | OUTPATIENT
Start: 2020-08-21 | End: 2020-08-21

## 2020-08-21 RX ORDER — MAGNESIUM HYDROXIDE 1200 MG/15ML
LIQUID ORAL AS NEEDED
Status: DISCONTINUED | OUTPATIENT
Start: 2020-08-21 | End: 2020-08-21 | Stop reason: HOSPADM

## 2020-08-21 RX ORDER — MIDAZOLAM HYDROCHLORIDE 2 MG/2ML
INJECTION, SOLUTION INTRAMUSCULAR; INTRAVENOUS AS NEEDED
Status: DISCONTINUED | OUTPATIENT
Start: 2020-08-21 | End: 2020-08-21

## 2020-08-21 RX ORDER — CLINDAMYCIN PHOSPHATE 900 MG/50ML
INJECTION INTRAVENOUS AS NEEDED
Status: DISCONTINUED | OUTPATIENT
Start: 2020-08-21 | End: 2020-08-21

## 2020-08-21 RX ORDER — SODIUM CHLORIDE, SODIUM LACTATE, POTASSIUM CHLORIDE, CALCIUM CHLORIDE 600; 310; 30; 20 MG/100ML; MG/100ML; MG/100ML; MG/100ML
125 INJECTION, SOLUTION INTRAVENOUS CONTINUOUS
Status: DISCONTINUED | OUTPATIENT
Start: 2020-08-21 | End: 2020-08-21 | Stop reason: HOSPADM

## 2020-08-21 RX ORDER — HYDROCODONE BITARTRATE AND ACETAMINOPHEN 5; 325 MG/1; MG/1
1 TABLET ORAL EVERY 6 HOURS PRN
Status: DISCONTINUED | OUTPATIENT
Start: 2020-08-21 | End: 2020-08-21 | Stop reason: HOSPADM

## 2020-08-21 RX ORDER — SUCCINYLCHOLINE/SOD CL,ISO/PF 100 MG/5ML
SYRINGE (ML) INTRAVENOUS AS NEEDED
Status: DISCONTINUED | OUTPATIENT
Start: 2020-08-21 | End: 2020-08-21

## 2020-08-21 RX ORDER — ALBUTEROL SULFATE 2.5 MG/3ML
2.5 SOLUTION RESPIRATORY (INHALATION) ONCE AS NEEDED
Status: DISCONTINUED | OUTPATIENT
Start: 2020-08-21 | End: 2020-08-21 | Stop reason: HOSPADM

## 2020-08-21 RX ORDER — SULFAMETHOXAZOLE AND TRIMETHOPRIM 800; 160 MG/1; MG/1
1 TABLET ORAL EVERY 12 HOURS SCHEDULED
Qty: 6 TABLET | Refills: 0 | Status: SHIPPED | OUTPATIENT
Start: 2020-08-21 | End: 2020-08-24

## 2020-08-21 RX ORDER — LIDOCAINE HYDROCHLORIDE 20 MG/ML
JELLY TOPICAL AS NEEDED
Status: DISCONTINUED | OUTPATIENT
Start: 2020-08-21 | End: 2020-08-21 | Stop reason: HOSPADM

## 2020-08-21 RX ORDER — FENTANYL CITRATE/PF 50 MCG/ML
25 SYRINGE (ML) INJECTION
Status: DISCONTINUED | OUTPATIENT
Start: 2020-08-21 | End: 2020-08-21 | Stop reason: HOSPADM

## 2020-08-21 RX ORDER — HYDROCODONE BITARTRATE AND ACETAMINOPHEN 5; 325 MG/1; MG/1
1 TABLET ORAL EVERY 6 HOURS PRN
Qty: 15 TABLET | Refills: 0 | Status: ON HOLD | OUTPATIENT
Start: 2020-08-21 | End: 2020-09-01 | Stop reason: SDUPTHER

## 2020-08-21 RX ORDER — FENTANYL CITRATE 50 UG/ML
INJECTION, SOLUTION INTRAMUSCULAR; INTRAVENOUS AS NEEDED
Status: DISCONTINUED | OUTPATIENT
Start: 2020-08-21 | End: 2020-08-21

## 2020-08-21 RX ORDER — LIDOCAINE HYDROCHLORIDE 10 MG/ML
0.5 INJECTION, SOLUTION EPIDURAL; INFILTRATION; INTRACAUDAL; PERINEURAL ONCE AS NEEDED
Status: DISCONTINUED | OUTPATIENT
Start: 2020-08-21 | End: 2020-08-21 | Stop reason: HOSPADM

## 2020-08-21 RX ORDER — PROPOFOL 10 MG/ML
INJECTION, EMULSION INTRAVENOUS AS NEEDED
Status: DISCONTINUED | OUTPATIENT
Start: 2020-08-21 | End: 2020-08-21

## 2020-08-21 RX ORDER — TAMSULOSIN HYDROCHLORIDE 0.4 MG/1
0.4 CAPSULE ORAL
Qty: 30 CAPSULE | Refills: 0 | Status: ON HOLD | OUTPATIENT
Start: 2020-08-21 | End: 2020-09-01 | Stop reason: SDUPTHER

## 2020-08-21 RX ORDER — ONDANSETRON 2 MG/ML
4 INJECTION INTRAMUSCULAR; INTRAVENOUS ONCE AS NEEDED
Status: DISCONTINUED | OUTPATIENT
Start: 2020-08-21 | End: 2020-08-21 | Stop reason: HOSPADM

## 2020-08-21 RX ORDER — METOCLOPRAMIDE HYDROCHLORIDE 5 MG/ML
10 INJECTION INTRAMUSCULAR; INTRAVENOUS ONCE AS NEEDED
Status: DISCONTINUED | OUTPATIENT
Start: 2020-08-21 | End: 2020-08-21 | Stop reason: HOSPADM

## 2020-08-21 RX ORDER — SODIUM CHLORIDE, SODIUM LACTATE, POTASSIUM CHLORIDE, CALCIUM CHLORIDE 600; 310; 30; 20 MG/100ML; MG/100ML; MG/100ML; MG/100ML
INJECTION, SOLUTION INTRAVENOUS CONTINUOUS PRN
Status: DISCONTINUED | OUTPATIENT
Start: 2020-08-21 | End: 2020-08-21

## 2020-08-21 RX ORDER — CLINDAMYCIN PHOSPHATE 900 MG/50ML
900 INJECTION INTRAVENOUS ONCE
Status: DISCONTINUED | OUTPATIENT
Start: 2020-08-21 | End: 2020-08-21 | Stop reason: HOSPADM

## 2020-08-21 RX ORDER — PHENAZOPYRIDINE HYDROCHLORIDE 100 MG/1
100 TABLET, FILM COATED ORAL 3 TIMES DAILY PRN
Qty: 20 TABLET | Refills: 0 | Status: SHIPPED | OUTPATIENT
Start: 2020-08-21 | End: 2020-09-04 | Stop reason: HOSPADM

## 2020-08-21 RX ORDER — PHENAZOPYRIDINE HYDROCHLORIDE 100 MG/1
100 TABLET, FILM COATED ORAL ONCE
Status: COMPLETED | OUTPATIENT
Start: 2020-08-21 | End: 2020-08-21

## 2020-08-21 RX ORDER — LEVOFLOXACIN 5 MG/ML
750 INJECTION, SOLUTION INTRAVENOUS ONCE
Status: CANCELLED | OUTPATIENT
Start: 2020-08-21 | End: 2020-08-21

## 2020-08-21 RX ORDER — TAMSULOSIN HYDROCHLORIDE 0.4 MG/1
0.4 CAPSULE ORAL ONCE
Status: COMPLETED | OUTPATIENT
Start: 2020-08-21 | End: 2020-08-21

## 2020-08-21 RX ORDER — LIDOCAINE HYDROCHLORIDE 10 MG/ML
INJECTION, SOLUTION EPIDURAL; INFILTRATION; INTRACAUDAL; PERINEURAL AS NEEDED
Status: DISCONTINUED | OUTPATIENT
Start: 2020-08-21 | End: 2020-08-21

## 2020-08-21 RX ADMIN — FENTANYL CITRATE 50 MCG: 50 INJECTION, SOLUTION INTRAMUSCULAR; INTRAVENOUS at 09:31

## 2020-08-21 RX ADMIN — PHENAZOPYRIDINE 100 MG: 100 TABLET ORAL at 11:39

## 2020-08-21 RX ADMIN — FENTANYL CITRATE 50 MCG: 50 INJECTION, SOLUTION INTRAMUSCULAR; INTRAVENOUS at 09:41

## 2020-08-21 RX ADMIN — LIDOCAINE HYDROCHLORIDE 50 MG: 10 INJECTION, SOLUTION EPIDURAL; INFILTRATION; INTRACAUDAL; PERINEURAL at 09:31

## 2020-08-21 RX ADMIN — TAMSULOSIN HYDROCHLORIDE 0.4 MG: 0.4 CAPSULE ORAL at 11:39

## 2020-08-21 RX ADMIN — Medication 140 MG: at 09:32

## 2020-08-21 RX ADMIN — DEXAMETHASONE SODIUM PHOSPHATE 4 MG: 10 INJECTION, SOLUTION INTRAMUSCULAR; INTRAVENOUS at 09:43

## 2020-08-21 RX ADMIN — ONDANSETRON 4 MG: 2 INJECTION INTRAMUSCULAR; INTRAVENOUS at 09:43

## 2020-08-21 RX ADMIN — CLINDAMYCIN PHOSPHATE 900 MG: 18 INJECTION, SOLUTION INTRAMUSCULAR; INTRAVENOUS at 09:35

## 2020-08-21 RX ADMIN — PROPOFOL 200 MG: 10 INJECTION, EMULSION INTRAVENOUS at 09:31

## 2020-08-21 RX ADMIN — KETOROLAC TROMETHAMINE 30 MG: 30 INJECTION, SOLUTION INTRAMUSCULAR at 10:33

## 2020-08-21 RX ADMIN — MIDAZOLAM HYDROCHLORIDE 2 MG: 1 INJECTION, SOLUTION INTRAMUSCULAR; INTRAVENOUS at 09:20

## 2020-08-21 RX ADMIN — SODIUM CHLORIDE, SODIUM LACTATE, POTASSIUM CHLORIDE, AND CALCIUM CHLORIDE: .6; .31; .03; .02 INJECTION, SOLUTION INTRAVENOUS at 09:20

## 2020-08-21 RX ADMIN — PROPOFOL 150 MG: 10 INJECTION, EMULSION INTRAVENOUS at 09:32

## 2020-08-21 NOTE — TELEPHONE ENCOUNTER
Called and left a message for patient  Return to work note completed as requested  Sent to patient via my chart account and faxed electronically through epic to his employer at the fax number provided  Patient can FU on Monday if copies not received

## 2020-08-21 NOTE — ANESTHESIA POSTPROCEDURE EVALUATION
Post-Op Assessment Note    CV Status:  Stable    Pain management: adequate     Mental Status:  Awake   Hydration Status:  Euvolemic   PONV Controlled:  Controlled   Airway Patency:  Patent      Post Op Vitals Reviewed: Yes      Staff: CRNA   Comments: Pt able to maintain own airway, VSS, report to PACU RN        No complications documented      /70 (08/21/20 1045)    Temp (!) 97 3 °F (36 3 °C) (08/21/20 1045)    Pulse 70 (08/21/20 1045)   Resp 16 (08/21/20 1045)    SpO2 98 % (08/21/20 1045)

## 2020-08-21 NOTE — OP NOTE
OPERATIVE REPORT  PATIENT NAME: Loretta Curran    :  1985  MRN: [id number]  Pt Location: [facility] OR [facility]OM 04    SURGERY DATE: 2020    Surgeon(s) and Role:     * Maya Kimble MD - Primary    Preop Diagnosis:  Kidney stone [N20 0]    Post-Op Diagnosis Codes:     * Kidney stone [N20 0]    Procedure(s) (LRB):  CYSTOSCOPY URETEROSCOPY WITH LITHOTRIPSY HOLMIUM LASER, RETROGRADE PYELOGRAM AND INSERTION STENT URETERAL (Right)    Specimen(s):  * No specimens in log *    Estimated Blood Loss:   Minimal    Drains:  Ureteral Drain/Stent Right ureter 6 Fr  (Active)   Number of days: 0       Anesthesia Type:   General    Operative Indications:  Kidney stone [N20 0]      Operative Findings:  1  Significant stenosis of the distal RIGHT ureter, unable to pass dual lumen nor inner cannula of the 10/12 access sheath  2  5 3F ureteroscope did pass  3  Stone identified in lower pole, fragmented with laser  4  Difficult to clear the lower pole given the medial orientation of the calyx  5  Residual small fragmented stone burden  6  6x26 Stent placed without string    Complications:   None    Procedure and Technique:  Loretta Curran is a 38y o -year-old male with a 14mm right lower pole stone  They are proceeding to the operating room on 2020 to undergo right-sided ureteroscopy with holmium laser lithotripsy  Risk and benefits of the procedure were discussed and reviewed  Informed consent was obtained  After the smooth induction of general endotracheal anesthesia, the patient was placed in the dorsal lithotomy position  His genitalia was prepped and draped in a sterile fashion  Intravenous antibiotics were administered in the form of clindamycin  A timeout was performed with all members of the operative team confirming the patient's identity, procedure to be performed, and laterality of the case  A 22 Croatian rigid cystoscope with 30° lens was inserted  The bladder was thoroughly inspected    Attention was focused on the right ureteral orifice  Bard solo +wire was passed up to the level of the renal pelvis  We attempted introduced a dual-lumen catheter  This was met with significant resistance at the ureteral orifice  We were able to perform a retrograde pyelogram that demonstrated no significant fluoroscopic narrowing and minimal hydronephrosis  A 2nd wire was introduced  We attempted to navigate the dual-lumen catheter more proximally or unable to do so  We then attempted the inner should cannula of the 10/12 access sheath  There was too much stenosis for this to pass as well  Fortunately,a 5 3F Olympus flexible ureteroscope was able to traverse the stenosis up to the level of the renal pelvis  The kidney was thoroughly inspected  A stone was identified in the lower pole  It appeared to be lodged in the infundibulum  The 200 µ holmium laser fiber was utilized to [employer] the stone into small fragments  A [employer] SkyLite basket was then used to reposition the largest piece in the upper pole and again fragmentation was performed  There was some residual stone fragments that were 2 mm in size in the upper pole  We reinspected the anterior and lower pole calices and did notice in the medial portion of the lower pole calyx there was some additional stones  Unfortunately the scope was unable to flex in this position potentially due to anatomy or the stenosis in the distal ureter  At this point, we noticed that our safety wire had retracted into the proximal ureter and so a new safety wire was passed through the scope  The scope was removed in a push-pull fashion leaving the new safety wire in place  Over the previous safety wire, we attempted to repeat passed the 5 3 flexible scope but were unable to do so pass the stenosis  At this point time we felt that it would be best to simply place a stent and return for clearance of the kidney at a later date      A 6 Amharic 26cm double-J ureteral stent was then placed in the standard fashion  The proximal coil was appreciated in the RIGHT renal pelvis however the distal coil appeared in to the distal ureter  Using a semi rigid ureteral scope the distal coil was grasped and removed to a more appropriate position     A string was not left in place  The bladder was emptied  2% viscous lidocaine was placed per urethra    Overall the patient tolerated the procedure well and there were no complications  The patient was taken out of dorsal lithotomy, extubated in the operating room and transferred to the PACU in stable condition at the conclusion of the case      Plan-the patient will need a second-look ureteroscopy which I will hope to perform the very near future once the stent how allowed for ureteral dilation     I was present for the entire procedure    Patient Disposition:  PACU     SIGNATURE: Jorje Ocampo MD  DATE: August 21, 2020  TIME: 10:42 AM

## 2020-08-21 NOTE — TELEPHONE ENCOUNTER
Patient s/p CYSTOSCOPY URETEROSCOPY WITH LITHOTRIPSY HOLMIUM LASER, RETROGRADE PYELOGRAM AND INSERTION STENT URETERAL (Right Bladder) today (8/21/20) with Martha Damon  Please advise if it is ok for patient to return to work on 8/25/20 with no restrictions as requested  Thank you

## 2020-08-21 NOTE — H&P
UROLOGY PREOPERATIVE H&P NOTE     CHIEF COMPLAINT   Lisa Ball is a 38 y o  male with a complaint of RIGHT renal stone    History of Present Illness:     38 y o  male  With a history of left-sided stones  Patient was recently seen in the emergency room with a 13 mm right lower pole stone as well as small ureteral fragment  Last night the patient had significant pain and feels like he passed the small fragment  He presents today for discussion  History of gastric bypass  Previously on calcium citrate but is now on calcium carbonate due to cost issues  Works as a   Surgery dlayed due to COVID-19  Now presents      Past Medical History:     Past Medical History:   Diagnosis Date    Bilateral carpal tunnel syndrome 10/31/2017    Cerumen debris on tympanic membrane, right 10/23/2016    GERD (gastroesophageal reflux disease)     Obstructive sleep apnea on CPAP 8/21/2012    Added automatically from request for surgery [id number]    Plantar fasciitis of right foot 4/11/2013    Prediabetes 1/30/2018    Added automatically from request for surgery [id number]    Tear of MCL (medial collateral ligament) of knee        PAST SURGICAL HISTORY:     Past Surgical History:   Procedure Laterality Date    ABDOMINAL SURGERY      gastric sleeve 5/2018    BARIATRIC SURGERY      KNEE SURGERY Left        CURRENT MEDICATIONS:     Current Facility-Administered Medications   Medication Dose Route Frequency Provider Last Rate Last Dose    lactated ringers infusion  125 mL/hr Intravenous Continuous Radha Lance MD        lidocaine (PF) (XYLOCAINE-MPF) 1 % injection 0 5 mL  0 5 mL Infiltration Once PRN Radha Lance MD           ALLERGIES:     Allergies   Allergen Reactions    Penicillins Anaphylaxis    Meloxicam Rash       SOCIAL HISTORY:     Social History     Socioeconomic History    Marital status: /Civil Union     Spouse name: None    Number of children: None    Years of education: None  Highest education level: None   Occupational History    None   Social Needs    Financial resource strain: None    Food insecurity     Worry: None     Inability: None    Transportation needs     Medical: None     Non-medical: None   Tobacco Use    Smoking status: Never Smoker    Smokeless tobacco: Never Used   Substance and Sexual Activity    Alcohol use: Yes     Frequency: Monthly or less     Drinks per session: 1 or 2    Drug use: Not Currently    Sexual activity: Yes     Partners: Female   Lifestyle    Physical activity     Days per week: None     Minutes per session: None    Stress: None   Relationships    Social connections     Talks on phone: None     Gets together: None     Attends Rastafarian service: None     Active member of club or organization: None     Attends meetings of clubs or organizations: None     Relationship status: None    Intimate partner violence     Fear of current or ex partner: None     Emotionally abused: None     Physically abused: None     Forced sexual activity: None   Other Topics Concern    None   Social History Narrative    Lives with wife and son     Works at [email or web address]:     Family History   Problem Relation Age of Onset    No Known Problems Mother     Hypertension Father     Diabetes Father     Coronary artery disease Father        REVIEW OF SYSTEMS:     Review of Systems   Constitutional: Negative  Negative for chills and fever  Respiratory: Negative  Cardiovascular: Negative  Gastrointestinal: Positive for abdominal pain  Genitourinary: Positive for flank pain  Skin: Negative  Psychiatric/Behavioral: Negative  PHYSICAL EXAM:     Temp 97 6 °F (36 4 °C)   Ht 6' (1 829 m)   Wt 136 kg (300 lb)   BMI 40 69 kg/m²     General:  Healthy appearing male in no acute distress  They have a normal affect  There is not appear to be any gross neurologic defects or abnormalities    HEENT:  Normocephalic, atraumatic  Neck is supple without any palpable lymphadenopathy  Cardiovascular:  Patient has normal palpable distal radial pulses  There is no significant peripheral edema  No JVD is noted  Respiratory:  Patient has unlabored respirations  There is no audible wheeze or rhonchi  Abdomen:  Abdomen is  Healed gastric bypass surgical scars  Abdomen is soft and nontender  There is no tympany  Inguinal and umbilical hernia are not appreciated  Musculoskeletal:  Patient does not have significant CVA tenderness in the  flank with palpation or percussion  They full range of motion in all 4 extremities  Strength in all 4 extremities appears congruent  Patient is able to ambulate without assistance or difficulty  Dermatologic:  Patient has no skin abnormalities or rashes  LABS:     CBC:   Lab Results   Component Value Date    WBC 6 58 03/15/2020    HGB 14 7 03/15/2020    HCT 45 4 03/15/2020    MCV 90 03/15/2020     03/15/2020       BMP:   Lab Results   Component Value Date    CALCIUM 9 0 03/15/2020    K 3 7 03/15/2020    CO2 27 03/15/2020     03/15/2020    BUN 15 03/15/2020    CREATININE 0 73 03/15/2020       IMAGING:     3/4/20  CT ABDOMEN AND PELVIS WITHOUT IV CONTRAST - LOW DOSE RENAL STONE      INDICATION:   right flank pain      COMPARISON:  5/6/2019      TECHNIQUE:  Low dose thin section CT examination of the abdomen and pelvis was performed without intravenous or oral contrast according to a protocol specifically designed to evaluate for urinary tract calculus  Axial, sagittal, and coronal 2D   reformatted images were created from the source data and submitted for interpretation  Evaluation for pathology in the abdomen and pelvis that is unrelated to urinary tract calculi is limited       Radiation dose length product (DLP) for this visit:  011 mGy-cm     This examination, like all CT scans performed in the [facility], was performed utilizing techniques to minimize radiation dose exposure, including the use of iterative   reconstruction and automated exposure control       FINDINGS:     RIGHT KIDNEY AND URETER:  Nonobstructive right lower renal pole 13 mm calculus identified  Additionally there is a punctate proximal right ureteral calculus on 601/93 resulting in mild right hydroureteronephrosis with the distal right ureter appearing decompressed      LEFT KIDNEY AND URETER:  Small nonobstructive left-sided nephrolithiasis      URINARY BLADDER:   Unremarkable      No significant abnormality in the visualized lung bases      Limited low radiation dose noncontrast CT evaluation demonstrates no clinically significant abnormality of liver, spleen, pancreas, or adrenal glands  No calcified gallstones or gallbladder wall thickening noted  No ascites or bulky lymphadenopathy on this limited noncontrast study  Diverticular disease without diverticulitis  Small sliding-type hiatal hernia noted  Postop gastrectomy as before  Limited evaluation demonstrates no evidence to suggest acute appendicitis  No acute fracture or destructive osseous lesion is identified  Fat-containing umbilical hernia as before      IMPRESSION:  Right greater than left nephrolithiasis as above with a punctate proximal right ureteral calculus identified resulting in mild right hydroureteronephrosis  ASSESSMENT:     38 y o  male with  13 mm right lower pole stone and ureteral fragment likely passed    PLAN:     Presents for cystoscopy, [RIGHT] retrograde pyelogram, ureteroscopy with possible laser lithotripsy and basket extraction of stone, stent placement  Risks and benefits of the procedure were reviewed including the potential for the inability to treat the stone at the index surgery and need for secondary procedures  Patient understands this risks and has signed informed consent

## 2020-08-21 NOTE — DISCHARGE INSTRUCTIONS
René, you had a tight narrowing of the bottom part of your RIGHT ureter  We were able to place the scope and achieve fragmentation of the lower pole stone however, clearance of all of these pieces proved difficult due to the narrowing  A stent was placed and I would like to return to the OR in the very near future to perform a second look and clear all the small pieces  The stent will remain in place until this next surgery  Ureteroscopy   WHAT YOU NEED TO KNOW:   A ureteroscopy is a procedure to examine in the inside of your urinary tract, which includes your urethra, bladder, ureters, and kidneys  A ureteroscope is a small, thin tube with a light and camera on the end  Ureteroscopy can help your healthcare provider diagnose and treat problems in your urinary tract, such as kidney stones  DISCHARGE INSTRUCTIONS:   Medicine:   · Antibiotics  may be given to treat or prevent an infection  · Take your medicine as directed  Contact your healthcare provider if you think your medicine is not helping or if you have side effects  Tell him or her if you are allergic to any medicine  Keep a list of the medicines, vitamins, and herbs you take  Include the amounts, and when and why you take them  Bring the list or the pill bottles to follow-up visits  Carry your medicine list with you in case of an emergency  Follow up with your healthcare provider as directed:  Write down your questions so you remember to ask them during your visits  Drink liquids as directed  Liquids can help prevent kidney stones and urinary tract infections  Drink water and limit the amount of caffeine you drink  Caffeine may be found in coffee, tea, soda, sports drinks, and foods  Ask your healthcare provider how much liquid to drink each day  Contact your healthcare provider if:   · You have a fever  · You cannot urinate  · You have blood in your urine  · You are vomiting  · You have pain in your abdomen or side       · You have questions or concerns about your condition or care  © 2017 [employer] Information is for End User's use only and may not be sold, redistributed or otherwise used for commercial purposes  All illustrations and images included in CareNotes® are the copyrighted property of A [employer] Inc  or [employer]  The above information is an  only  It is not intended as medical advice for individual conditions or treatments  Talk to your doctor, nurse or pharmacist before following any medical regimen to see if it is safe and effective for you

## 2020-08-24 ENCOUNTER — TELEPHONE (OUTPATIENT)
Dept: UROLOGY | Facility: MEDICAL CENTER | Age: 35
End: 2020-08-24

## 2020-08-24 NOTE — TELEPHONE ENCOUNTER
I called patient back and scheduled his surgery for 9/1 at [facility] with Dr Isabel Breeding  I verbal went over instructions,    - no PATS needed  -hospital call the night before with time  -NPO after midnight day before  Patient verbally understands

## 2020-08-27 NOTE — PRE-PROCEDURE INSTRUCTIONS
Pre-Surgery Instructions:   Medication Instructions    amLODIPine (NORVASC) 10 mg tablet Advised pt to take morning of surgery with small sip of water   calcium citrate (CALCITRATE) 950 MG tablet Instructed patient per Anesthesia Guidelines   HYDROcodone-acetaminophen (NORCO) 5-325 mg per tablet Instructed patient per Anesthesia Guidelines   Multiple Vitamin (MULTIVITAMIN) tablet Instructed patient per Anesthesia Guidelines   Omeprazole 20 MG TBDD Advised pt to take morning of surgery with small sip of water   phenazopyridine (PYRIDIUM) 100 mg tablet Instructed patient per Anesthesia Guidelines   tamsulosin (FLOMAX) 0 4 mg Instructed patient per Anesthesia Guidelines

## 2020-08-31 NOTE — TELEPHONE ENCOUNTER
Letter faxed to [phone] as requested    Called and left message for patient that letter was faxed as requested  Patient is to call the office back with any other questions or concerns

## 2020-08-31 NOTE — TELEPHONE ENCOUNTER
Patient called and advised that his work note advised that he had his procedure on 7/31/2020 instead of 8/21/2020  Patient employer is questioning the letter  Patient requesting new letter be faxed to [phone] with the procedure date of 8/21/2020 and return to work on 8/25/2020  Please advise

## 2020-09-01 ENCOUNTER — TELEPHONE (OUTPATIENT)
Dept: UROLOGY | Facility: CLINIC | Age: 35
End: 2020-09-01

## 2020-09-01 ENCOUNTER — APPOINTMENT (OUTPATIENT)
Dept: RADIOLOGY | Facility: HOSPITAL | Age: 35
End: 2020-09-01
Payer: COMMERCIAL

## 2020-09-01 ENCOUNTER — HOSPITAL ENCOUNTER (OUTPATIENT)
Facility: HOSPITAL | Age: 35
Setting detail: OUTPATIENT SURGERY
Discharge: HOME | End: 2020-09-01
Attending: UROLOGY | Admitting: UROLOGY
Payer: COMMERCIAL

## 2020-09-01 ENCOUNTER — ANESTHESIA (OUTPATIENT)
Dept: PERIOP | Facility: HOSPITAL | Age: 35
End: 2020-09-01
Payer: COMMERCIAL

## 2020-09-01 ENCOUNTER — ANESTHESIA EVENT (OUTPATIENT)
Dept: PERIOP | Facility: HOSPITAL | Age: 35
End: 2020-09-01
Payer: COMMERCIAL

## 2020-09-01 VITALS
HEIGHT: 72 IN | BODY MASS INDEX: 39.71 KG/M2 | WEIGHT: 293.21 LBS | HEART RATE: 71 BPM | OXYGEN SATURATION: 95 % | DIASTOLIC BLOOD PRESSURE: 69 MMHG | RESPIRATION RATE: 16 BRPM | TEMPERATURE: 98.1 F | SYSTOLIC BLOOD PRESSURE: 140 MMHG

## 2020-09-01 DIAGNOSIS — N20.0 CALCULUS OF KIDNEY: Primary | ICD-10-CM

## 2020-09-01 DIAGNOSIS — N20.0 RIGHT RENAL STONE: ICD-10-CM

## 2020-09-01 DIAGNOSIS — N20.0 NEPHROLITHIASIS: ICD-10-CM

## 2020-09-01 PROCEDURE — NC001 PR NO CHARGE: Performed by: UROLOGY

## 2020-09-01 PROCEDURE — 74420 UROGRAPHY RTRGR +-KUB: CPT

## 2020-09-01 PROCEDURE — 52356 CYSTO/URETERO W/LITHOTRIPSY: CPT | Performed by: UROLOGY

## 2020-09-01 PROCEDURE — 82360 CALCULUS ASSAY QUANT: CPT | Performed by: UROLOGY

## 2020-09-01 PROCEDURE — C2617 STENT, NON-COR, TEM W/O DEL: HCPCS | Performed by: UROLOGY

## 2020-09-01 PROCEDURE — C1758 CATHETER, URETERAL: HCPCS | Performed by: UROLOGY

## 2020-09-01 PROCEDURE — C1769 GUIDE WIRE: HCPCS | Performed by: UROLOGY

## 2020-09-01 DEVICE — INLAY OPTIMA URETERAL STENT W/O GUIDEWIRE
Type: IMPLANTABLE DEVICE | Status: FUNCTIONAL
Brand: BARD® INLAY OPTIMA® URETERAL STENT

## 2020-09-01 RX ORDER — ONDANSETRON 2 MG/ML
INJECTION INTRAMUSCULAR; INTRAVENOUS AS NEEDED
Status: DISCONTINUED | OUTPATIENT
Start: 2020-09-01 | End: 2020-09-01

## 2020-09-01 RX ORDER — MIDAZOLAM HYDROCHLORIDE 2 MG/2ML
INJECTION, SOLUTION INTRAMUSCULAR; INTRAVENOUS AS NEEDED
Status: DISCONTINUED | OUTPATIENT
Start: 2020-09-01 | End: 2020-09-01

## 2020-09-01 RX ORDER — TAMSULOSIN HYDROCHLORIDE 0.4 MG/1
0.4 CAPSULE ORAL ONCE
Status: DISCONTINUED | OUTPATIENT
Start: 2020-09-01 | End: 2020-09-04 | Stop reason: HOSPADM

## 2020-09-01 RX ORDER — TAMSULOSIN HYDROCHLORIDE 0.4 MG/1
0.4 CAPSULE ORAL
Qty: 30 CAPSULE | Refills: 0 | Status: SHIPPED | OUTPATIENT
Start: 2020-09-01 | End: 2020-09-14

## 2020-09-01 RX ORDER — FENTANYL CITRATE/PF 50 MCG/ML
50 SYRINGE (ML) INJECTION
Status: DISCONTINUED | OUTPATIENT
Start: 2020-09-01 | End: 2020-09-01 | Stop reason: HOSPADM

## 2020-09-01 RX ORDER — SODIUM CHLORIDE, SODIUM LACTATE, POTASSIUM CHLORIDE, CALCIUM CHLORIDE 600; 310; 30; 20 MG/100ML; MG/100ML; MG/100ML; MG/100ML
INJECTION, SOLUTION INTRAVENOUS CONTINUOUS PRN
Status: DISCONTINUED | OUTPATIENT
Start: 2020-09-01 | End: 2020-09-01

## 2020-09-01 RX ORDER — PROPOFOL 10 MG/ML
INJECTION, EMULSION INTRAVENOUS AS NEEDED
Status: DISCONTINUED | OUTPATIENT
Start: 2020-09-01 | End: 2020-09-01

## 2020-09-01 RX ORDER — LIDOCAINE HYDROCHLORIDE 20 MG/ML
JELLY TOPICAL AS NEEDED
Status: DISCONTINUED | OUTPATIENT
Start: 2020-09-01 | End: 2020-09-01 | Stop reason: HOSPADM

## 2020-09-01 RX ORDER — DEXAMETHASONE SODIUM PHOSPHATE 10 MG/ML
INJECTION, SOLUTION INTRAMUSCULAR; INTRAVENOUS AS NEEDED
Status: DISCONTINUED | OUTPATIENT
Start: 2020-09-01 | End: 2020-09-01

## 2020-09-01 RX ORDER — FENTANYL CITRATE 50 UG/ML
INJECTION, SOLUTION INTRAMUSCULAR; INTRAVENOUS AS NEEDED
Status: DISCONTINUED | OUTPATIENT
Start: 2020-09-01 | End: 2020-09-01

## 2020-09-01 RX ORDER — MAGNESIUM HYDROXIDE 1200 MG/15ML
LIQUID ORAL AS NEEDED
Status: DISCONTINUED | OUTPATIENT
Start: 2020-09-01 | End: 2020-09-01 | Stop reason: HOSPADM

## 2020-09-01 RX ORDER — LIDOCAINE HYDROCHLORIDE 10 MG/ML
INJECTION, SOLUTION EPIDURAL; INFILTRATION; INTRACAUDAL; PERINEURAL AS NEEDED
Status: DISCONTINUED | OUTPATIENT
Start: 2020-09-01 | End: 2020-09-01

## 2020-09-01 RX ORDER — LABETALOL 20 MG/4 ML (5 MG/ML) INTRAVENOUS SYRINGE
10
Status: DISCONTINUED | OUTPATIENT
Start: 2020-09-01 | End: 2020-09-04 | Stop reason: HOSPADM

## 2020-09-01 RX ORDER — KETOROLAC TROMETHAMINE 30 MG/ML
INJECTION, SOLUTION INTRAMUSCULAR; INTRAVENOUS AS NEEDED
Status: DISCONTINUED | OUTPATIENT
Start: 2020-09-01 | End: 2020-09-01

## 2020-09-01 RX ORDER — LEVOFLOXACIN 5 MG/ML
750 INJECTION, SOLUTION INTRAVENOUS ONCE
Status: COMPLETED | OUTPATIENT
Start: 2020-09-01 | End: 2020-09-01

## 2020-09-01 RX ORDER — HYDROCODONE BITARTRATE AND ACETAMINOPHEN 5; 325 MG/1; MG/1
1 TABLET ORAL EVERY 6 HOURS PRN
Qty: 10 TABLET | Refills: 0 | Status: SHIPPED | OUTPATIENT
Start: 2020-09-01 | End: 2020-09-24 | Stop reason: ALTCHOICE

## 2020-09-01 RX ORDER — PHENAZOPYRIDINE HYDROCHLORIDE 100 MG/1
100 TABLET, FILM COATED ORAL ONCE
Status: DISCONTINUED | OUTPATIENT
Start: 2020-09-01 | End: 2020-09-04 | Stop reason: HOSPADM

## 2020-09-01 RX ORDER — SODIUM CHLORIDE, SODIUM LACTATE, POTASSIUM CHLORIDE, CALCIUM CHLORIDE 600; 310; 30; 20 MG/100ML; MG/100ML; MG/100ML; MG/100ML
20 INJECTION, SOLUTION INTRAVENOUS CONTINUOUS
Status: DISCONTINUED | OUTPATIENT
Start: 2020-09-01 | End: 2020-09-04 | Stop reason: HOSPADM

## 2020-09-01 RX ADMIN — FENTANYL CITRATE 100 MCG: 50 INJECTION, SOLUTION INTRAMUSCULAR; INTRAVENOUS at 10:23

## 2020-09-01 RX ADMIN — ONDANSETRON 4 MG: 2 INJECTION INTRAMUSCULAR; INTRAVENOUS at 11:05

## 2020-09-01 RX ADMIN — DEXAMETHASONE SODIUM PHOSPHATE 4 MG: 10 INJECTION, SOLUTION INTRAMUSCULAR; INTRAVENOUS at 10:15

## 2020-09-01 RX ADMIN — PROPOFOL 300 MG: 10 INJECTION, EMULSION INTRAVENOUS at 10:13

## 2020-09-01 RX ADMIN — LEVOFLOXACIN 750 MG: 5 INJECTION, SOLUTION INTRAVENOUS at 09:47

## 2020-09-01 RX ADMIN — SODIUM CHLORIDE, SODIUM LACTATE, POTASSIUM CHLORIDE, AND CALCIUM CHLORIDE: .6; .31; .03; .02 INJECTION, SOLUTION INTRAVENOUS at 10:00

## 2020-09-01 RX ADMIN — MIDAZOLAM HYDROCHLORIDE 2 MG: 1 INJECTION, SOLUTION INTRAMUSCULAR; INTRAVENOUS at 10:07

## 2020-09-01 RX ADMIN — LIDOCAINE HYDROCHLORIDE 100 MG: 10 INJECTION, SOLUTION EPIDURAL; INFILTRATION; INTRACAUDAL; PERINEURAL at 10:12

## 2020-09-01 RX ADMIN — KETOROLAC TROMETHAMINE 30 MG: 30 INJECTION, SOLUTION INTRAMUSCULAR at 11:09

## 2020-09-01 NOTE — H&P
UROLOGY PREOPERATIVE H&P NOTE     CHIEF COMPLAINT   Abiodun Peter is a 38 y o  male with a complaint of RIGHT renal stone    History of Present Illness:     38 y o  male  With a history of left-sided stones  Patient was recently seen in the emergency room with a 13 mm right lower pole stone as well as small ureteral fragment  Last night the patient had significant pain and feels like he passed the small fragment  He presents today for discussion  History of gastric bypass  Previously on calcium citrate but is now on calcium carbonate due to cost issues  Works as a   Patient underwent ureteroscopy on August 21st   We fragmented the large burden of stone however the residual fragments that could not be basketed given the inability to place ureteral access sheath  I recommended short interval return the patient presents for this today      Past Medical History:     Past Medical History:   Diagnosis Date    Bilateral carpal tunnel syndrome 10/31/2017    Cerumen debris on tympanic membrane, right 10/23/2016    GERD (gastroesophageal reflux disease)     Hypertension     Kidney stone     Obstructive sleep apnea on CPAP 8/21/2012    Added automatically from request for surgery [id number]    Plantar fasciitis of right foot 4/11/2013    Prediabetes 1/30/2018    Added automatically from request for surgery [id number]    Tear of MCL (medial collateral ligament) of knee        PAST SURGICAL HISTORY:     Past Surgical History:   Procedure Laterality Date    ABDOMINAL SURGERY      gastric sleeve 5/2018    BARIATRIC SURGERY      FL RETROGRADE PYELOGRAM  8/21/2020    KNEE SURGERY Left     WA CYSTO/URETERO W/LITHOTRIPSY &INDWELL STENT INSRT Right 8/21/2020    Procedure: CYSTOSCOPY URETEROSCOPY WITH LITHOTRIPSY HOLMIUM LASER, RETROGRADE PYELOGRAM AND INSERTION STENT URETERAL;  Surgeon: Verdell Gilford, MD;  Location: [facility];  Service: Urology       CURRENT MEDICATIONS:     Current Facility-Administered Medications   Medication Dose Route Frequency Provider Last Rate Last Dose    Labetalol HCl (NORMODYNE) injection 10 mg  10 mg Intravenous Q5 Min PRN Luis Osorio MD        levofloxacin (LEVAQUIN) IVPB (premix) 750 mg 150 mL  750 mg Intravenous Once Emily Villalobos  mL/hr at 09/01/20 0947 750 mg at 09/01/20 0947       ALLERGIES:     Allergies   Allergen Reactions    Penicillins Anaphylaxis    Meloxicam Rash       SOCIAL HISTORY:     Social History     Socioeconomic History    Marital status: /Civil Union     Spouse name: None    Number of children: None    Years of education: None    Highest education level: None   Occupational History    None   Social Needs    Financial resource strain: None    Food insecurity     Worry: None     Inability: None    Transportation needs     Medical: None     Non-medical: None   Tobacco Use    Smoking status: Never Smoker    Smokeless tobacco: Never Used   Substance and Sexual Activity    Alcohol use: Yes     Frequency: Monthly or less     Drinks per session: 1 or 2    Drug use: Not Currently    Sexual activity: Yes     Partners: Female   Lifestyle    Physical activity     Days per week: None     Minutes per session: None    Stress: None   Relationships    Social connections     Talks on phone: None     Gets together: None     Attends Sabianist service: None     Active member of club or organization: None     Attends meetings of clubs or organizations: None     Relationship status: None    Intimate partner violence     Fear of current or ex partner: None     Emotionally abused: None     Physically abused: None     Forced sexual activity: None   Other Topics Concern    None   Social History Narrative    Lives with wife and son     Works at [address]:     Family History   Problem Relation Age of Onset    No Known Problems Mother     Hypertension Father     Diabetes Father    Sammie Vanessa Coronary artery disease Father        REVIEW OF SYSTEMS:     Review of Systems   Constitutional: Negative  Negative for chills and fever  Respiratory: Negative  Cardiovascular: Negative  Gastrointestinal: Positive for abdominal pain  Genitourinary: Positive for flank pain  Skin: Negative  Psychiatric/Behavioral: Negative  PHYSICAL EXAM:     /66   Pulse 64   Temp 97 5 °F (36 4 °C) (Temporal)   Resp 16   Ht 6' (1 829 m)   Wt 133 kg (293 lb 3 4 oz)   SpO2 97%   BMI 39 77 kg/m²     General:  Healthy appearing male in no acute distress  They have a normal affect  There is not appear to be any gross neurologic defects or abnormalities  HEENT:  Normocephalic, atraumatic  Neck is supple without any palpable lymphadenopathy  Cardiovascular:  Patient has normal palpable distal radial pulses  There is no significant peripheral edema  No JVD is noted  Respiratory:  Patient has unlabored respirations  There is no audible wheeze or rhonchi  Abdomen:  Abdomen is  Healed gastric bypass surgical scars  Abdomen is soft and nontender  There is no tympany  Inguinal and umbilical hernia are not appreciated  Musculoskeletal:  Patient does not have significant CVA tenderness in the  flank with palpation or percussion  They full range of motion in all 4 extremities  Strength in all 4 extremities appears congruent  Patient is able to ambulate without assistance or difficulty  Dermatologic:  Patient has no skin abnormalities or rashes        LABS:     CBC:   Lab Results   Component Value Date    WBC 6 58 03/15/2020    HGB 14 7 03/15/2020    HCT 45 4 03/15/2020    MCV 90 03/15/2020     03/15/2020       BMP:   Lab Results   Component Value Date    CALCIUM 9 0 03/15/2020    K 3 7 03/15/2020    CO2 27 03/15/2020     03/15/2020    BUN 15 03/15/2020    CREATININE 0 73 03/15/2020       IMAGING:     3/4/20  CT ABDOMEN AND PELVIS WITHOUT IV CONTRAST - LOW DOSE RENAL STONE    INDICATION:   right flank pain      COMPARISON:  5/6/2019      TECHNIQUE:  Low dose thin section CT examination of the abdomen and pelvis was performed without intravenous or oral contrast according to a protocol specifically designed to evaluate for urinary tract calculus  Axial, sagittal, and coronal 2D   reformatted images were created from the source data and submitted for interpretation  Evaluation for pathology in the abdomen and pelvis that is unrelated to urinary tract calculi is limited       Radiation dose length product (DLP) for this visit:  614 mGy-cm   This examination, like all CT scans performed in the [facility], was performed utilizing techniques to minimize radiation dose exposure, including the use of iterative   reconstruction and automated exposure control       FINDINGS:     RIGHT KIDNEY AND URETER:  Nonobstructive right lower renal pole 13 mm calculus identified  Additionally there is a punctate proximal right ureteral calculus on 601/93 resulting in mild right hydroureteronephrosis with the distal right ureter appearing decompressed      LEFT KIDNEY AND URETER:  Small nonobstructive left-sided nephrolithiasis      URINARY BLADDER:   Unremarkable      No significant abnormality in the visualized lung bases      Limited low radiation dose noncontrast CT evaluation demonstrates no clinically significant abnormality of liver, spleen, pancreas, or adrenal glands  No calcified gallstones or gallbladder wall thickening noted  No ascites or bulky lymphadenopathy on this limited noncontrast study  Diverticular disease without diverticulitis  Small sliding-type hiatal hernia noted  Postop gastrectomy as before  Limited evaluation demonstrates no evidence to suggest acute appendicitis  No acute fracture or destructive osseous lesion is identified    Fat-containing umbilical hernia as before      IMPRESSION:  Right greater than left nephrolithiasis as above with a punctate proximal right ureteral calculus identified resulting in mild right hydroureteronephrosis  ASSESSMENT:     38 y o  male with  13 mm right lower pole stone and ureteral fragment likely passed    PLAN:     Patient recently underwent ureteroscopy however we were unable to place access sheath  Given the large burden of stone, fragmentation of the stone was performed via flexible ureteroscopy however we could not effectively basket all the fragments  I recommended short interval return for second-look cystoscopy, right retrograde pyelogram, ureteroscopy, laser lithotripsy of any large residual stones and basket extraction with stent exchange or replacement  Patient had a repeat culture which was negative  Antibiotics have been ordered  I marked the right side  Consent has previously been signed  We will proceed today

## 2020-09-01 NOTE — DISCHARGE INSTRUCTIONS
**Over the counter "AZO" can be used in lieu of pyridium for stent pain control**    You have stent on a string, which will be removed in 3-5 days  Please take care not to remove with dressing or undressing  Our office staff will contact you to arrange an appointment for removal     Ureteroscopy   WHAT YOU NEED TO KNOW:   A ureteroscopy is a procedure to examine in the inside of your urinary tract, which includes your urethra, bladder, ureters, and kidneys  A ureteroscope is a small, thin tube with a light and camera on the end  Ureteroscopy can help your healthcare provider diagnose and treat problems in your urinary tract, such as kidney stones  DISCHARGE INSTRUCTIONS:   Medicine:   · Antibiotics  may be given to treat or prevent an infection  · Take your medicine as directed  Contact your healthcare provider if you think your medicine is not helping or if you have side effects  Tell him or her if you are allergic to any medicine  Keep a list of the medicines, vitamins, and herbs you take  Include the amounts, and when and why you take them  Bring the list or the pill bottles to follow-up visits  Carry your medicine list with you in case of an emergency  Follow up with your healthcare provider as directed:  Write down your questions so you remember to ask them during your visits  Drink liquids as directed  Liquids can help prevent kidney stones and urinary tract infections  Drink water and limit the amount of caffeine you drink  Caffeine may be found in coffee, tea, soda, sports drinks, and foods  Ask your healthcare provider how much liquid to drink each day  Contact your healthcare provider if:   · You have a fever  · You cannot urinate  · You have blood in your urine  · You are vomiting  · You have pain in your abdomen or side  · You have questions or concerns about your condition or care    © 2017 [facility] Information is for End User's use only and may not be sold, redistributed or otherwise used for commercial purposes  All illustrations and images included in CareNotes® are the copyrighted property of A D A M , Inc  or Angel Eaton  The above information is an  only  It is not intended as medical advice for individual conditions or treatments  Talk to your doctor, nurse or pharmacist before following any medical regimen to see if it is safe and effective for you

## 2020-09-01 NOTE — TELEPHONE ENCOUNTER
Patient is scheduled with office RN for stent on string removal for Friday September 4 2020 at 930am      Patient was given the phone number to central scheduling to schedule his [address]  Patient is aware that he is to get his xray done as well PTV  Patient is scheduled for a 3 MO f/u with [facility] on 11/20/2020 at 1:15pm with Miladis Oakes PA-C in the [place] office  No further questions or concerns at this time

## 2020-09-01 NOTE — ANESTHESIA PREPROCEDURE EVALUATION
Procedure:  CYSTOSCOPY URETEROSCOPY WITH LITHOTRIPSY HOLMIUM LASER, RETROGRADE PYELOGRAM AND INSERTION STENT URETERAL (Right Bladder)    Relevant Problems   GI/HEPATIC   (+) Bariatric surgery status   (+) GERD (gastroesophageal reflux disease)      /RENAL   (+) Nephrolithiasis      MUSCULOSKELETAL   (+) Chronic low back pain      NEURO/PSYCH   (+) Facial paresthesia      PULMONARY   (+) Sleep apnea             Anesthesia Plan  ASA Score- 2     Anesthesia Type- general with ASA Monitors  Additional Monitors:   Airway Plan: LMA  Plan Factors-Exercise tolerance (METS): >4 METS  Chart reviewed  Patient is not a current smoker  Patient not instructed to abstain from smoking on day of procedure  Patient did not smoke on day of surgery  Induction- intravenous  Postoperative Plan-     Informed Consent- Anesthetic plan and risks discussed with patient  I personally reviewed this patient with the CRNA  Discussed and agreed on the Anesthesia Plan with the CRNA  Florentino Singer

## 2020-09-01 NOTE — TELEPHONE ENCOUNTER
Stent on string removal in 3-5 days, follow up x-ray and ultrasound in 3 months please   Advanced practitioner visit

## 2020-09-01 NOTE — ANESTHESIA POSTPROCEDURE EVALUATION
Post-Op Assessment Note    CV Status:  Stable  Pain Score: 0    Pain management: adequate     Mental Status:  Alert and awake   Hydration Status:  Stable   PONV Controlled:  None   Airway Patency:  Patent and adequate      Post Op Vitals Reviewed: Yes      Staff: Anesthesiologist, CRNA         No complications documented      BP (P) 136/79 (09/01/20 1122)    Temp (!) (P) 97 1 °F (36 2 °C) (09/01/20 1122)    Pulse (P) 72 (09/01/20 1122)   Resp (P) 14 (09/01/20 1122)    SpO2 (P) 100 % (09/01/20 1122)

## 2020-09-01 NOTE — OP NOTE
OPERATIVE REPORT  PATIENT NAME: Brinda Narayanan    :  1985  MRN: [id number]  Pt Location: [facility] ROOM 04    SURGERY DATE: 2020    Surgeon(s) and Role:     * Azucena Winter MD - Primary    Preop Diagnosis:  Right renal stone [N20 0]    Post-Op Diagnosis Codes:     * Right renal stone [N20 0]    Procedure(s) (LRB):  CYSTOSCOPY URETEROSCOPY WITH LITHOTRIPSY HOLMIUM LASER, RETROGRADE PYELOGRAM AND INSERTION STENT URETERAL (Right)    Specimen(s):  ID Type Source Tests Collected by Time Destination   A : right renal stone Calculus Ureter, Right STONE ANALYSIS Azucena Winter MD 2020 1110        Estimated Blood Loss:   Minimal    Drains:  Ureteral Drain/Stent Right ureter 6 Fr  (Active)   Number of days: 11       Ureteral Drain/Stent 6 Fr  (Active)   Site Assessment Clean; Intact 20 1122   Number of days: 0       Anesthesia Type:   General    Operative Indications:  Right renal stone [N20 0]      Operative Findings:  1  Lower pole stones, basket and moved into upper pole where laser lithotripsy performed  2  New 6x26 stent with string    Complications:   None    Procedure and Technique:  Brinda Narayanan is a 38y o -year-old male with a large right sided stone  They are proceeding to the operating room on 2020  to undergo second-look right-sided ureteroscopy with holmium laser lithotripsy  Risk and benefits of the procedure were discussed and reviewed  Informed consent was obtained  After the smooth induction of general anesthesia, the patient was placed in the dorsal lithotomy position  His genitalia was prepped and draped in a sterile fashion  Intravenous antibiotics were administered in the form of Levaquin  A timeout was performed with all members of the operative team confirming the patient's identity, procedure to be performed, and laterality of the case  A 22 Mongolian rigid cystoscope with 30° lens was inserted  The bladder was thoroughly inspected    Attention was focused on the right ureteral orifice  There was a previously placed stent present  Bard Solo Plus wire was then passed alongside the stent up to the level of the renal pelvis  Stent was removed intact  Over the wire, dual-lumen catheter was placed  A retrograde pyelogram was performed demonstrating mild hydronephrosis  At this point, a second wire was passed  One wire was secured as a "safety" wire while the other was used as a "working" wire  The dual lumen was removed leaving the wires in place  A 12/14 45cm ureteral access sheath was then inserted over the working wire  Because the patient's prior narrowing of the distal ureter, we passed the internal sheath 1st and again noticed some narrowing  Ultimately we were able to pass the full sheath  An Olympus flexible ureteroscope was then passed  The kidney was thoroughly inspected  A stone was identified in the lower pole  Some small fragments were initially basket removed  Three large fragments were repositioned into the upper pole with the basket     The 272 µ holmium laser fiber was utilized to [employer] the stone into small fragments  A [employer] basket was then used to remove all visualized fragments larger than 2mm     Contrast was injected  The kidney was thoroughly reinspected in the upper, inerpolar and lower pole regions  There are some very small less than 2 mm stones and stone debris but no large stones left in place  The flexible scope was slowly withdrawn through the ureter during a pull-out ureteroscopy  There were no residual stones within the ureter  The wire was backloaded through the cystoscope and a 6 [employer] [place] 26cm double-J ureteral stent was then placed in the standard fashion  The proximal coil was appreciated in the right renal pelvis and the distal coil was visualized within the bladder  A string was left in place  The bladder was emptied and the cystoscope was removed  The string was secured to the dorsal penis with Tegaderm  2% viscous lidocaine was placed per urethra  Overall the patient tolerated the procedure well and there were no complications  The patient was taken out of dorsal lithotomy, extubated in the operating room and transferred to the PACU in stable condition at the conclusion of the case      Plan-stent on string removal in 3-5 days     I was present for the entire procedure    Patient Disposition:  PACU     SIGNATURE: Kaiden Hobbs MD  DATE: September 1, 2020  TIME: 11:42 AM

## 2020-09-04 ENCOUNTER — CLINICAL SUPPORT (OUTPATIENT)
Dept: UROLOGY | Facility: CLINIC | Age: 35
End: 2020-09-04
Payer: COMMERCIAL

## 2020-09-04 VITALS
SYSTOLIC BLOOD PRESSURE: 148 MMHG | TEMPERATURE: 97.9 F | HEART RATE: 88 BPM | DIASTOLIC BLOOD PRESSURE: 90 MMHG | BODY MASS INDEX: 40.63 KG/M2 | HEIGHT: 72 IN | WEIGHT: 300 LBS

## 2020-09-04 DIAGNOSIS — N20.0 NEPHROLITHIASIS: Primary | ICD-10-CM

## 2020-09-04 PROCEDURE — 99211 OFF/OP EST MAY X REQ PHY/QHP: CPT

## 2020-09-04 NOTE — PATIENT INSTRUCTIONS
URETERAL STENTS     [id number]  [id number]  A ureteral stent is a soft plastic tube with holes in it  It's temporarily inserted into a ureter to help drain urine into the bladder  One end goes in the kidney  The other end goes in the bladder  A coil on each end holds the stent in place  The stent can't be seen from outside the body  It shouldn't interfere with your normal routine  When Is a Ureteral Stent Used?  To bypass a blockage in a kidney or ureter   During kidney stone removal    To let a ureter heal after surgery  After the Procedure:  After the procedure you may experience the following symptoms  All of these are normal and should resolve within 1 or 2 days after your stent is removed:   Urinary frequency (urinating more often than usual)   Urinary urgency (the sensation that you need to urinate right away)   Painful urination (this can be pain in your bladder or in your back when  you urinate)   Blood in your urine ( a stent can irritate the lining of your bladder causing it to bleed)   Back/Flank pain, especially with urination    Staying Comfortable with a Stent in Place:   Fluids: Stay very well hydrated  Try to drink at least 6-8 glasses of water per day   Ibuprofen 600mg every 6-8 hours  Ask your doctor if you need to reduce the dose (ie, for renal insufficiency)   Flomax (tamsulosin): Usually provided at hospital discharge  Taken daily, relaxes the muscle around the ureter tube, and makes stents more comfortable   Narcotics (Percocet, Vicodin, Oxycodone) are usually provided at hospital discharge - for severe pain when needed   Use a stool softener! Constipation worsens stent discomfort  We recommend using Colace, Miralax, and/or Senna every day  Stent Removal:  You will be scheduled for stent removal in the office  In most cases this is removed by the nurse, using the black string which is secured from your urethra    Sometimes stents are removed in the office using a cystoscope  Stent removal   Blood may continue in the urine for a few days   May develop Flank pain on the side the stent was placed, this may last a few days  Take ibuprofen 600 mg by mouth, three times a day (every 8 hours), as needed   Increase water intake   No restrictions after stent removed      Call Your Doctor   You have a fever over 101°F (38 5°C), chills, nausea, or vomiting   Your urine contains blood clots or you see a large amount of blood-tinged urine      Your pain is not relieved with medication   You constantly leak urine   Your stent is accidentally removed

## 2020-09-04 NOTE — PROGRESS NOTES
9/4/2020  Ludy Elliott is a 38 y o  male  [id number]        Diagnosis  Chief Complaint     Nephrolithiasis          Patient is s/p right ureteroscopy with stone extraction on 9/1/2020 with Dr Janette Hernandez  Patient will FU in 3 months with renal US and kub ptv  Reviewed post stent removal instructions, ER precautions and written information provided  Procedure Stent with String Removal    Vitals:    09/04/20 0937   BP: 148/90   BP Location: Right arm   Patient Position: Sitting   Cuff Size: Standard   Pulse: 88   Temp: 97 9 °F (36 6 °C)   TempSrc: Temporal   Weight: 136 kg (300 lb)   Height: 6' (1 829 m)       Stent with string removed intact without difficulty  Reviewed post stent removal symptoms including flank pain, dysuria, and hematuria  Instructed patient to increase oral fluid intake  Encouraged the use of NSAIDS and other prescribed pain medication as needed for discomfort  Patient instructed to call the office or report to the ER for uncontrolled pain, fever, chills, nausea or vomiting         KEYUR Monique BSN

## 2020-09-12 DIAGNOSIS — N20.0 NEPHROLITHIASIS: ICD-10-CM

## 2020-09-14 LAB
CALCIUM OXALATE DIHYDRATE MFR STONE IR: 10 %
COLOR STONE: NORMAL
COM MFR STONE: 90 %
COMMENT-STONE3: NORMAL
COMPOSITION: NORMAL
LABORATORY COMMENT REPORT: NORMAL
PHOTO: NORMAL
SIZE STONE: NORMAL MM
SPEC SOURCE SUBJ: NORMAL
STONE ANALYSIS-IMP: NORMAL
WT STONE: 147 MG

## 2020-09-14 RX ORDER — TAMSULOSIN HYDROCHLORIDE 0.4 MG/1
0.4 CAPSULE ORAL
Qty: 30 CAPSULE | Refills: 0 | Status: SHIPPED | OUTPATIENT
Start: 2020-09-14 | End: 2020-09-24 | Stop reason: ALTCHOICE

## 2020-09-18 ENCOUNTER — TELEPHONE (OUTPATIENT)
Dept: UROLOGY | Facility: AMBULATORY SURGERY CENTER | Age: 35
End: 2020-09-18

## 2020-09-23 PROBLEM — I10 ESSENTIAL HYPERTENSION: Status: ACTIVE | Noted: 2020-09-23

## 2020-09-23 PROBLEM — N20.0 NEPHROLITHIASIS: Status: RESOLVED | Noted: 2020-03-06 | Resolved: 2020-09-23

## 2020-09-23 PROBLEM — S83.249A TEAR OF MEDIAL MENISCUS OF KNEE: Status: ACTIVE | Noted: 2018-02-09

## 2020-09-23 PROBLEM — S83.249A TEAR OF MEDIAL MENISCUS OF KNEE: Status: RESOLVED | Noted: 2018-02-09 | Resolved: 2020-09-23

## 2020-09-23 NOTE — PROGRESS NOTES
Miguel Stockton 1985 male MRN: [id number]      ASSESSMENT/PLAN  Problem List Items Addressed This Visit        Digestive    GERD (gastroesophageal reflux disease)    Relevant Medications    Omeprazole 20 MG TBDD       Cardiovascular and Mediastinum    Essential hypertension - Primary    Relevant Medications    amLODIPine (NORVASC) 10 mg tablet       Genitourinary    Nephrolithiasis       Other    Bariatric surgery status    Relevant Medications    Omeprazole 20 MG TBDD        HTN: Will schedule nurse visit to compare home cuff to in office  If equivalent, pt may benefit from further home monitoring, given intermittent BPs above goal   GERD: Refill PPI sent  S/p Bariatric Surgery: Encouraged to f/u with Bariatrics   Nephrolithiasis: Symptoms resolved       Future Appointments   Date Time Provider Shaunna Todd   10/1/2020  9:00  N [facility]   12/4/2020  8:30 AM [facility]   12/11/2020 10:00 AM Tiana Briscoe PA-C [facility]          SUBJECTIVE  CC: Medication Renew      HPI:   Miguel Stockton is a 38 y o  male who presents for follow up of chronic conditions as detailed below  HTN: Home BPs 140-150s/70-90s  His cuff has not been calibrated in our office  ROS benign as below  GERD: Since bariatric surgery, has to take PPI or cannot tolerate symptoms   S/p Bariatric Sx: Has not scheduled f/u with Bariatrics   Nephrolithiasis: S/p stenting and removal of stone  No further urologic symptoms  Review of Systems   Eyes: Negative for visual disturbance  Respiratory: Negative for shortness of breath  Cardiovascular: Negative for chest pain, palpitations and leg swelling  Genitourinary: Negative for dysuria and hematuria  Neurological: Negative for dizziness and light-headedness         Historical Information   The patient history was reviewed and updated as follows:    Past Medical History:   Diagnosis Date    Arthritis 2007    Bilateral carpal tunnel syndrome 10/31/2017    Cerumen debris on tympanic membrane, right 10/23/2016    Facial paresthesia 11/20/2013    GERD (gastroesophageal reflux disease)     Hypertension     Kidney stone     Nephrolithiasis 3/6/2020    Added automatically from request for surgery [id number]    Obstructive sleep apnea on CPAP 8/21/2012    Added automatically from request for surgery [id number]    Plantar fasciitis of right foot 4/11/2013    Prediabetes 1/30/2018    Added automatically from request for surgery [id number]    Tear of MCL (medial collateral ligament) of knee     Tear of medial meniscus of knee 2/9/2018     Past Surgical History:   Procedure Laterality Date    ABDOMINAL SURGERY      gastric sleeve 5/2018    BARIATRIC SURGERY      FL RETROGRADE PYELOGRAM  8/21/2020    FL RETROGRADE PYELOGRAM  9/1/2020    KNEE SURGERY Left     VA CYSTO/URETERO W/LITHOTRIPSY &INDWELL STENT INSRT Right 8/21/2020    Procedure: CYSTOSCOPY URETEROSCOPY WITH LITHOTRIPSY HOLMIUM LASER, RETROGRADE PYELOGRAM AND INSERTION STENT URETERAL;  Surgeon: Pretty Duarte MD;  [facility];  Service: Urology    VA CYSTO/URETERO W/LITHOTRIPSY &INDWELL STENT INSRT Right 9/1/2020    Procedure: CYSTOSCOPY URETEROSCOPY WITH LITHOTRIPSY HOLMIUM LASER, RETROGRADE PYELOGRAM AND INSERTION STENT URETERAL;  Surgeon: Pretty Duarte MD;  [facility];  Service: Urology     Family History   Problem Relation Age of Onset    No Known Problems Mother     Hypertension Father     Diabetes Father     Coronary artery disease Father     Arthritis Father         He has bad arthritis    Social History   Social History     Substance and Sexual Activity   Alcohol Use Yes    Alcohol/week: 0 0 standard drinks    Frequency: Monthly or less    Drinks per session: 1 or 2    Comment: I drink maybe one beer every 3 months         Social History     Substance and Sexual Activity   Drug Use Never     Social History Tobacco Use   Smoking Status Never Smoker   Smokeless Tobacco Never Used       Medications:     Current Outpatient Medications:     amLODIPine (NORVASC) 10 mg tablet, Take 1 tablet (10 mg total) by mouth daily, Disp: 90 tablet, Rfl: 1    calcium citrate (CALCITRATE) 950 MG tablet, Take 1 tablet (950 mg total) by mouth 3 (three) times a day, Disp: 90 tablet, Rfl: 6    Multiple Vitamin (MULTIVITAMIN) tablet, Take 1 tablet by mouth daily, Disp: , Rfl:     Omeprazole 20 MG TBDD, Take 20 mg by mouth daily, Disp: 90 tablet, Rfl: 1  Allergies   Allergen Reactions    Penicillins Anaphylaxis    Meloxicam Rash       OBJECTIVE    Vitals:   Vitals:    09/24/20 1114   BP: 118/76   Pulse: 71   Temp: (!) 97 °F (36 1 °C)   SpO2: 97%   Weight: (!) 136 kg (300 lb 12 8 oz)   Height: 6' (1 829 m)           Physical Exam  Vitals signs and nursing note reviewed  Constitutional:       General: He is not in acute distress  Appearance: Normal appearance  HENT:      Head: Normocephalic and atraumatic  Right Ear: Tympanic membrane and ear canal normal       Left Ear: Tympanic membrane and ear canal normal       Nose: Nose normal       Mouth/Throat:      Mouth: Mucous membranes are moist       Pharynx: No oropharyngeal exudate or posterior oropharyngeal erythema  Eyes:      Conjunctiva/sclera: Conjunctivae normal    Cardiovascular:      Rate and Rhythm: Normal rate and regular rhythm  Pulmonary:      Effort: Pulmonary effort is normal  No respiratory distress  Breath sounds: Normal breath sounds  Abdominal:      General: Bowel sounds are normal  There is no distension  Palpations: Abdomen is soft  Tenderness: There is no abdominal tenderness  Musculoskeletal:      Right lower leg: No edema  Left lower leg: No edema  Lymphadenopathy:      Cervical: No cervical adenopathy  Skin:     General: Skin is warm and dry  Neurological:      General: No focal deficit present        Mental Status: He is alert     Psychiatric:         Mood and Affect: Mood normal                     Luisa Vang DO  [facility]   9/24/2020  11:41 AM

## 2020-09-24 ENCOUNTER — OFFICE VISIT (OUTPATIENT)
Dept: FAMILY MEDICINE CLINIC | Facility: CLINIC | Age: 35
End: 2020-09-24
Payer: COMMERCIAL

## 2020-09-24 VITALS
DIASTOLIC BLOOD PRESSURE: 76 MMHG | TEMPERATURE: 97 F | OXYGEN SATURATION: 97 % | HEART RATE: 71 BPM | WEIGHT: 300.8 LBS | HEIGHT: 72 IN | BODY MASS INDEX: 40.74 KG/M2 | SYSTOLIC BLOOD PRESSURE: 118 MMHG

## 2020-09-24 DIAGNOSIS — N20.0 NEPHROLITHIASIS: ICD-10-CM

## 2020-09-24 DIAGNOSIS — I10 ESSENTIAL HYPERTENSION: Primary | ICD-10-CM

## 2020-09-24 DIAGNOSIS — K21.9 GASTROESOPHAGEAL REFLUX DISEASE, ESOPHAGITIS PRESENCE NOT SPECIFIED: ICD-10-CM

## 2020-09-24 DIAGNOSIS — Z98.84 BARIATRIC SURGERY STATUS: ICD-10-CM

## 2020-09-24 PROCEDURE — 3078F DIAST BP <80 MM HG: CPT | Performed by: FAMILY MEDICINE

## 2020-09-24 PROCEDURE — 99214 OFFICE O/P EST MOD 30 MIN: CPT | Performed by: FAMILY MEDICINE

## 2020-09-24 PROCEDURE — 1036F TOBACCO NON-USER: CPT | Performed by: FAMILY MEDICINE

## 2020-09-24 RX ORDER — AMLODIPINE BESYLATE 10 MG/1
10 TABLET ORAL DAILY
Qty: 90 TABLET | Refills: 1 | Status: SHIPPED | OUTPATIENT
Start: 2020-09-24 | End: 2021-04-02 | Stop reason: SDUPTHER

## 2020-11-05 NOTE — TELEPHONE ENCOUNTER
Pt states he was instructed by Ellen Navarro to contact office for Return to Work Note 08/25/20 with No Restrictions he will call back with employers fax # ,please verify with physician  [History reviewed] : History reviewed. [Medications and Allergies reviewed] : Medications and allergies reviewed.

## 2020-11-09 DIAGNOSIS — K21.9 GASTROESOPHAGEAL REFLUX DISEASE: ICD-10-CM

## 2020-11-09 DIAGNOSIS — Z98.84 BARIATRIC SURGERY STATUS: ICD-10-CM

## 2020-11-25 ENCOUNTER — TELEMEDICINE (OUTPATIENT)
Dept: FAMILY MEDICINE CLINIC | Facility: CLINIC | Age: 35
End: 2020-11-25
Payer: COMMERCIAL

## 2020-11-25 VITALS — SYSTOLIC BLOOD PRESSURE: 102 MMHG | TEMPERATURE: 95.8 F | DIASTOLIC BLOOD PRESSURE: 88 MMHG

## 2020-11-25 DIAGNOSIS — Z20.822 EXPOSURE TO COVID-19 VIRUS: ICD-10-CM

## 2020-11-25 DIAGNOSIS — B34.9 VIRAL INFECTION, UNSPECIFIED: ICD-10-CM

## 2020-11-25 PROCEDURE — 99213 OFFICE O/P EST LOW 20 MIN: CPT | Performed by: FAMILY MEDICINE

## 2020-11-25 PROCEDURE — 1036F TOBACCO NON-USER: CPT | Performed by: FAMILY MEDICINE

## 2020-11-25 PROCEDURE — 3074F SYST BP LT 130 MM HG: CPT | Performed by: FAMILY MEDICINE

## 2020-11-25 PROCEDURE — 3079F DIAST BP 80-89 MM HG: CPT | Performed by: FAMILY MEDICINE

## 2020-11-25 PROCEDURE — U0003 INFECTIOUS AGENT DETECTION BY NUCLEIC ACID (DNA OR RNA); SEVERE ACUTE RESPIRATORY SYNDROME CORONAVIRUS 2 (SARS-COV-2) (CORONAVIRUS DISEASE [COVID-19]), AMPLIFIED PROBE TECHNIQUE, MAKING USE OF HIGH THROUGHPUT TECHNOLOGIES AS DESCRIBED BY CMS-2020-01-R: HCPCS | Performed by: FAMILY MEDICINE

## 2020-11-27 ENCOUNTER — TELEMEDICINE (OUTPATIENT)
Dept: FAMILY MEDICINE CLINIC | Facility: CLINIC | Age: 35
End: 2020-11-27
Payer: COMMERCIAL

## 2020-11-27 DIAGNOSIS — U07.1 COVID-19: Primary | ICD-10-CM

## 2020-11-27 LAB — SARS-COV-2 RNA SPEC QL NAA+PROBE: DETECTED

## 2020-11-27 PROCEDURE — 99213 OFFICE O/P EST LOW 20 MIN: CPT | Performed by: FAMILY MEDICINE

## 2020-12-02 ENCOUNTER — TELEPHONE (OUTPATIENT)
Dept: FAMILY MEDICINE CLINIC | Facility: CLINIC | Age: 35
End: 2020-12-02

## 2020-12-10 ENCOUNTER — HOSPITAL ENCOUNTER (OUTPATIENT)
Dept: ULTRASOUND IMAGING | Facility: HOSPITAL | Age: 35
Discharge: HOME | End: 2020-12-10
Attending: UROLOGY
Payer: COMMERCIAL

## 2020-12-10 DIAGNOSIS — N20.0 CALCULUS OF KIDNEY: ICD-10-CM

## 2020-12-10 PROCEDURE — 76770 US EXAM ABDO BACK WALL COMP: CPT

## 2020-12-18 ENCOUNTER — OFFICE VISIT (OUTPATIENT)
Dept: UROLOGY | Facility: MEDICAL CENTER | Age: 35
End: 2020-12-18
Payer: COMMERCIAL

## 2020-12-18 ENCOUNTER — TELEPHONE (OUTPATIENT)
Dept: UROLOGY | Facility: MEDICAL CENTER | Age: 35
End: 2020-12-18

## 2020-12-18 DIAGNOSIS — N20.0 NEPHROLITHIASIS: Primary | ICD-10-CM

## 2020-12-18 PROCEDURE — 99213 OFFICE O/P EST LOW 20 MIN: CPT | Performed by: NURSE PRACTITIONER

## 2021-01-19 DIAGNOSIS — Z23 ENCOUNTER FOR IMMUNIZATION: ICD-10-CM

## 2021-02-11 DIAGNOSIS — K21.9 GASTROESOPHAGEAL REFLUX DISEASE: ICD-10-CM

## 2021-02-11 DIAGNOSIS — Z98.84 BARIATRIC SURGERY STATUS: ICD-10-CM

## 2021-04-02 DIAGNOSIS — I10 ESSENTIAL HYPERTENSION: ICD-10-CM

## 2021-04-02 RX ORDER — AMLODIPINE BESYLATE 10 MG/1
10 TABLET ORAL DAILY
Qty: 90 TABLET | Refills: 2 | Status: SHIPPED | OUTPATIENT
Start: 2021-04-02 | End: 2021-12-27

## 2021-05-05 DIAGNOSIS — N20.0 NEPHROLITHIASIS: ICD-10-CM

## 2021-05-05 RX ORDER — IBUPROFEN 200 MG
CAPSULE ORAL
Qty: 270 TABLET | Refills: 2 | Status: SHIPPED | OUTPATIENT
Start: 2021-05-05 | End: 2021-11-12

## 2021-06-22 ENCOUNTER — OFFICE VISIT (OUTPATIENT)
Dept: URGENT CARE | Facility: CLINIC | Age: 36
End: 2021-06-22
Payer: COMMERCIAL

## 2021-06-22 VITALS
BODY MASS INDEX: 41.99 KG/M2 | SYSTOLIC BLOOD PRESSURE: 141 MMHG | TEMPERATURE: 98.6 F | RESPIRATION RATE: 18 BRPM | OXYGEN SATURATION: 99 % | HEIGHT: 72 IN | WEIGHT: 310 LBS | DIASTOLIC BLOOD PRESSURE: 75 MMHG | HEART RATE: 67 BPM

## 2021-06-22 DIAGNOSIS — L03.114 CELLULITIS OF LEFT UPPER EXTREMITY: Primary | ICD-10-CM

## 2021-06-22 DIAGNOSIS — L23.7 ALLERGIC CONTACT DERMATITIS DUE TO PLANTS, EXCEPT FOOD: ICD-10-CM

## 2021-06-22 PROCEDURE — 99213 OFFICE O/P EST LOW 20 MIN: CPT | Performed by: PHYSICIAN ASSISTANT

## 2021-06-22 RX ORDER — SULFAMETHOXAZOLE AND TRIMETHOPRIM 800; 160 MG/1; MG/1
1 TABLET ORAL EVERY 12 HOURS SCHEDULED
Qty: 14 TABLET | Refills: 0 | Status: SHIPPED | OUTPATIENT
Start: 2021-06-22 | End: 2021-06-29

## 2021-06-22 RX ORDER — TRIAMCINOLONE ACETONIDE 1 MG/G
CREAM TOPICAL 2 TIMES DAILY
Qty: 30 G | Refills: 0 | Status: SHIPPED | OUTPATIENT
Start: 2021-06-22 | End: 2021-12-23 | Stop reason: ALTCHOICE

## 2021-06-22 NOTE — PROGRESS NOTES
[facility]        NAME: Andra Enriquez is a 43 y o  male  : 1985    MRN: [id number]  DATE: 2021  TIME: 4:58 PM    Assessment and Plan   Cellulitis of left upper extremity [L03 114]  1  Cellulitis of left upper extremity  sulfamethoxazole-trimethoprim (BACTRIM DS) 800-160 mg per tablet   2  Allergic contact dermatitis due to plants, except food  triamcinolone (KENALOG) 0 1 % cream         Patient Instructions     Patient Instructions     Cellulitis   WHAT YOU NEED TO KNOW:   Cellulitis is a skin infection caused by bacteria  Cellulitis may go away on its own or you may need treatment  Your healthcare provider may draw a [place] around the outside edges of your cellulitis  If your cellulitis spreads, your healthcare provider will see it outside of the [place]  DISCHARGE INSTRUCTIONS:   Call 911 if:   · You have sudden trouble breathing or chest pain  Return to the emergency department if:   · Your wound gets larger and more painful  · You feel a crackling under your skin when you touch it  · You have purple dots or bumps on your skin, or you see bleeding under your skin  · You have new swelling and pain in your legs  · The red, warm, swollen area gets larger  · You see red streaks coming from the infected area  Contact your healthcare provider if:   · You have a fever  · Your fever or pain does not go away or gets worse  · The area does not get smaller after 2 days of antibiotics  · Your skin is flaking or peeling off  · You have questions or concerns about your condition or care  Medicines:   · Antibiotics  help treat the bacterial infection  · NSAIDs , such as ibuprofen, help decrease swelling, pain, and fever  NSAIDs can cause stomach bleeding or kidney problems in certain people  If you take blood thinner medicine, always ask if NSAIDs are safe for you  Always read the medicine label and follow directions   Do not give these medicines to children under 10months of age without direction from your child's healthcare provider  · Acetaminophen  decreases pain and fever  It is available without a doctor's order  Ask how much to take and how often to take it  Follow directions  Read the labels of all other medicines you are using to see if they also contain acetaminophen, or ask your doctor or pharmacist  Acetaminophen can cause liver damage if not taken correctly  Do not use more than 4 grams (4,000 milligrams) total of acetaminophen in one day  · Take your medicine as directed  Contact your healthcare provider if you think your medicine is not helping or if you have side effects  Tell him or her if you are allergic to any medicine  Keep a list of the medicines, vitamins, and herbs you take  Include the amounts, and when and why you take them  Bring the list or the pill bottles to follow-up visits  Carry your medicine list with you in case of an emergency  Self-care:   · Elevate the area above the level of your heart  as often as you can  This will help decrease swelling and pain  Prop the area on pillows or blankets to keep it elevated comfortably  · Clean the area daily until the wound scabs over  Gently wash the area with soap and water  Pat dry  Use dressings as directed  · Place cool or warm, wet cloths on the area as directed  Use clean cloths and clean water  Leave it on the area until the cloth is room temperature  Pat the area dry with a clean, dry cloth  The cloths may help decrease pain  Prevent cellulitis:   · Do not scratch bug bites or areas of injury  You increase your risk for cellulitis by scratching these areas  · Do not share personal items, such as towels, clothing, and razors  · Clean exercise equipment  with germ-killing  before and after you use it  · Wash your hands often  Use soap and water  Wash your hands after you use the bathroom, change a child's diapers, or sneeze   Wash your hands before you prepare or eat food  Use lotion to prevent dry, cracked skin  · Wear pressure stockings as directed  You may be told to wear the stockings if you have peripheral edema  The stockings improve blood flow and decrease swelling  · Treat athlete's foot  This can help prevent the spread of a bacterial skin infection  Follow up with your healthcare provider within 3 days, or as directed: Your healthcare provider will check if your cellulitis is getting better  You may need different medicine  Write down your questions so you remember to ask them during your visits  © Copyright [address] Information is for End User's use only and may not be sold, redistributed or otherwise used for commercial purposes  All illustrations and images included in CareNotes® are the copyrighted property of A D A M , Inc  or [address] [place], [place] [address]maria g Parra   The above information is an  only  It is not intended as medical advice for individual conditions or treatments  Talk to your doctor, nurse or pharmacist before following any medical regimen to see if it is safe and effective for you  Follow up with PCP in 3-5 days  Proceed to  ER if symptoms worsen  Chief Complaint     Chief Complaint   Patient presents with    Cellulitis     pt noticed his upper left arm swelling on friday and getting worse         History of Present Illness       Patient came in contact with poison ivy while cutting a tree a few days ago  Since then developed an itchy rash that has steadily spread up the arm  Feels red/hot/swollen  H/O poison ivy that started similarly but the warmth and swelling is new  Denies fever, fatigue, myalgias  Review of Systems   Review of Systems   Constitutional: Negative for fever  Skin: Positive for rash  Neurological: Negative for weakness           Current Medications       Current Outpatient Medications:     calcium citrate (CALCITRATE) 950 (200 Ca) MG tablet, TAKE 1 TABLET BY MOUTH 3 (THREE) TIMES A DAY, Disp: 270 tablet, Rfl: 2    Multiple Vitamin (MULTIVITAMIN) tablet, Take 1 tablet by mouth daily, Disp: , Rfl:     Omeprazole 20 MG TBDD, Take 20 mg by mouth daily, Disp: 90 tablet, Rfl: 1    amLODIPine (NORVASC) 10 mg tablet, Take 1 tablet (10 mg total) by mouth daily (Patient not taking: Reported on 6/22/2021), Disp: 90 tablet, Rfl: 2    sulfamethoxazole-trimethoprim (BACTRIM DS) 800-160 mg per tablet, Take 1 tablet by mouth every 12 (twelve) hours for 7 days, Disp: 14 tablet, Rfl: 0    triamcinolone (KENALOG) 0 1 % cream, Apply topically 2 (two) times a day for 14 days, Disp: 30 g, Rfl: 0    Current Allergies     Allergies as of 06/22/2021 - Reviewed 06/22/2021   Allergen Reaction Noted    Penicillins Anaphylaxis 05/06/2019    Meloxicam Rash 10/04/2017            The following portions of the patient's history were reviewed and updated as appropriate: allergies, current medications, past family history, past medical history, past social history, past surgical history and problem list      Past Medical History:   Diagnosis Date    Arthritis 2007    Bilateral carpal tunnel syndrome 10/31/2017    Cerumen debris on tympanic membrane, right 10/23/2016    Facial paresthesia 11/20/2013    GERD (gastroesophageal reflux disease)     Hypertension     Kidney stone     Nephrolithiasis 3/6/2020    Added automatically from request for surgery [id number]    Obstructive sleep apnea on CPAP 8/21/2012    Added automatically from request for surgery [id number]    Plantar fasciitis of right foot 4/11/2013    Prediabetes 1/30/2018    Added automatically from request for surgery [id number]    Tear of MCL (medial collateral ligament) of knee     Tear of medial meniscus of knee 2/9/2018       Past Surgical History:   Procedure Laterality Date    ABDOMINAL SURGERY      gastric sleeve 5/2018    BARIATRIC SURGERY      FL RETROGRADE PYELOGRAM  8/21/2020    FL RETROGRADE PYELOGRAM  9/1/2020  KNEE SURGERY Left     HI CYSTO/URETERO W/LITHOTRIPSY &INDWELL STENT INSRT Right 8/21/2020    Procedure: CYSTOSCOPY URETEROSCOPY WITH LITHOTRIPSY HOLMIUM LASER, RETROGRADE PYELOGRAM AND INSERTION STENT URETERAL;  Surgeon: Fernanda Camejo MD;  [facility];  Service: Urology    HI CYSTO/URETERO W/LITHOTRIPSY &INDWELL STENT INSRT Right 9/1/2020    Procedure: CYSTOSCOPY URETEROSCOPY WITH LITHOTRIPSY HOLMIUM LASER, RETROGRADE PYELOGRAM AND INSERTION STENT URETERAL;  Surgeon: Fernanda Camejo MD;  [facility];  Service: Urology       Family History   Problem Relation Age of Onset    No Known Problems Mother     Hypertension Father     Diabetes Father     Coronary artery disease Father     Arthritis Father         He has bad arthritis    Medications have been verified  Objective   /75   Pulse 67   Temp 98 6 °F (37 °C)   Resp 18   Ht 6' (1 829 m)   Wt (!) 141 kg (310 lb)   SpO2 99%   BMI 42 04 kg/m²        Physical Exam     Physical Exam  Constitutional:       Appearance: Normal appearance  Skin:            Comments: 21x13 cm area of blanchable erythema over the medial aspect of the left elbow and forearm  Small area of vesicle formation in a linear pattern over the medial forearm  Area is well demarcated  Neurological:      Mental Status: He is alert     Psychiatric:         Mood and Affect: Mood normal          Behavior: Behavior normal

## 2021-06-22 NOTE — PATIENT INSTRUCTIONS
Cellulitis   WHAT YOU NEED TO KNOW:   Cellulitis is a skin infection caused by bacteria  Cellulitis may go away on its own or you may need treatment  Your healthcare provider may draw a [place] around the outside edges of your cellulitis  If your cellulitis spreads, your healthcare provider will see it outside of the [place]  DISCHARGE INSTRUCTIONS:   Call 911 if:   · You have sudden trouble breathing or chest pain  Return to the emergency department if:   · Your wound gets larger and more painful  · You feel a crackling under your skin when you touch it  · You have purple dots or bumps on your skin, or you see bleeding under your skin  · You have new swelling and pain in your legs  · The red, warm, swollen area gets larger  · You see red streaks coming from the infected area  Contact your healthcare provider if:   · You have a fever  · Your fever or pain does not go away or gets worse  · The area does not get smaller after 2 days of antibiotics  · Your skin is flaking or peeling off  · You have questions or concerns about your condition or care  Medicines:   · Antibiotics  help treat the bacterial infection  · NSAIDs , such as ibuprofen, help decrease swelling, pain, and fever  NSAIDs can cause stomach bleeding or kidney problems in certain people  If you take blood thinner medicine, always ask if NSAIDs are safe for you  Always read the medicine label and follow directions  Do not give these medicines to children under 10months of age without direction from your child's healthcare provider  · Acetaminophen  decreases pain and fever  It is available without a doctor's order  Ask how much to take and how often to take it  Follow directions  Read the labels of all other medicines you are using to see if they also contain acetaminophen, or ask your doctor or pharmacist  Acetaminophen can cause liver damage if not taken correctly   Do not use more than 4 grams (4,000 milligrams) total of acetaminophen in one day  · Take your medicine as directed  Contact your healthcare provider if you think your medicine is not helping or if you have side effects  Tell him or her if you are allergic to any medicine  Keep a list of the medicines, vitamins, and herbs you take  Include the amounts, and when and why you take them  Bring the list or the pill bottles to follow-up visits  Carry your medicine list with you in case of an emergency  Self-care:   · Elevate the area above the level of your heart  as often as you can  This will help decrease swelling and pain  Prop the area on pillows or blankets to keep it elevated comfortably  · Clean the area daily until the wound scabs over  Gently wash the area with soap and water  Pat dry  Use dressings as directed  · Place cool or warm, wet cloths on the area as directed  Use clean cloths and clean water  Leave it on the area until the cloth is room temperature  Pat the area dry with a clean, dry cloth  The cloths may help decrease pain  Prevent cellulitis:   · Do not scratch bug bites or areas of injury  You increase your risk for cellulitis by scratching these areas  · Do not share personal items, such as towels, clothing, and razors  · Clean exercise equipment  with germ-killing  before and after you use it  · Wash your hands often  Use soap and water  Wash your hands after you use the bathroom, change a child's diapers, or sneeze  Wash your hands before you prepare or eat food  Use lotion to prevent dry, cracked skin  · Wear pressure stockings as directed  You may be told to wear the stockings if you have peripheral edema  The stockings improve blood flow and decrease swelling  · Treat athlete's foot  This can help prevent the spread of a bacterial skin infection  Follow up with your healthcare provider within 3 days, or as directed:   Your healthcare provider will check if your cellulitis is getting better  You may need different medicine  Write down your questions so you remember to ask them during your visits  © Copyright [address] Information is for End User's use only and may not be sold, redistributed or otherwise used for commercial purposes  All illustrations and images included in CareNotes® are the copyrighted property of A D A M , Inc  or [facility] Zuleima Parra   The above information is an  only  It is not intended as medical advice for individual conditions or treatments  Talk to your doctor, nurse or pharmacist before following any medical regimen to see if it is safe and effective for you

## 2021-10-02 DIAGNOSIS — K21.9 GASTROESOPHAGEAL REFLUX DISEASE: ICD-10-CM

## 2021-10-02 DIAGNOSIS — Z98.84 BARIATRIC SURGERY STATUS: ICD-10-CM

## 2021-10-04 RX ORDER — OMEPRAZOLE 20 MG/1
CAPSULE, DELAYED RELEASE ORAL
Qty: 90 CAPSULE | Refills: 1 | Status: SHIPPED | OUTPATIENT
Start: 2021-10-04 | End: 2022-04-04

## 2021-11-12 ENCOUNTER — OFFICE VISIT (OUTPATIENT)
Dept: FAMILY MEDICINE CLINIC | Facility: CLINIC | Age: 36
End: 2021-11-12
Payer: COMMERCIAL

## 2021-11-12 VITALS
WEIGHT: 315 LBS | DIASTOLIC BLOOD PRESSURE: 80 MMHG | TEMPERATURE: 98.2 F | HEART RATE: 74 BPM | HEIGHT: 72 IN | OXYGEN SATURATION: 98 % | SYSTOLIC BLOOD PRESSURE: 124 MMHG | BODY MASS INDEX: 42.66 KG/M2

## 2021-11-12 DIAGNOSIS — Z23 ENCOUNTER FOR IMMUNIZATION: ICD-10-CM

## 2021-11-12 DIAGNOSIS — Z11.1 SCREENING-PULMONARY TB: ICD-10-CM

## 2021-11-12 DIAGNOSIS — Z00.00 HEALTHCARE MAINTENANCE: Primary | ICD-10-CM

## 2021-11-12 PROBLEM — Z86.16 HISTORY OF COVID-19: Status: ACTIVE | Noted: 2020-11-27

## 2021-11-12 PROCEDURE — 99395 PREV VISIT EST AGE 18-39: CPT | Performed by: FAMILY MEDICINE

## 2021-11-12 PROCEDURE — 86580 TB INTRADERMAL TEST: CPT | Performed by: FAMILY MEDICINE

## 2021-11-12 PROCEDURE — 3725F SCREEN DEPRESSION PERFORMED: CPT | Performed by: FAMILY MEDICINE

## 2021-11-12 PROCEDURE — 90471 IMMUNIZATION ADMIN: CPT | Performed by: FAMILY MEDICINE

## 2021-11-12 PROCEDURE — 3008F BODY MASS INDEX DOCD: CPT | Performed by: FAMILY MEDICINE

## 2021-11-12 PROCEDURE — 90686 IIV4 VACC NO PRSV 0.5 ML IM: CPT | Performed by: FAMILY MEDICINE

## 2021-11-12 PROCEDURE — 1036F TOBACCO NON-USER: CPT | Performed by: FAMILY MEDICINE

## 2021-11-15 ENCOUNTER — CLINICAL SUPPORT (OUTPATIENT)
Dept: FAMILY MEDICINE CLINIC | Facility: CLINIC | Age: 36
End: 2021-11-15

## 2021-11-15 DIAGNOSIS — Z11.1 SCREENING-PULMONARY TB: Primary | ICD-10-CM

## 2021-11-15 LAB
INDURATION: 0 MM
TB SKIN TEST: NEGATIVE

## 2021-12-23 ENCOUNTER — OFFICE VISIT (OUTPATIENT)
Dept: UROLOGY | Facility: MEDICAL CENTER | Age: 36
End: 2021-12-23
Payer: COMMERCIAL

## 2021-12-23 VITALS — BODY MASS INDEX: 42.66 KG/M2 | WEIGHT: 315 LBS | HEIGHT: 72 IN

## 2021-12-23 DIAGNOSIS — N20.0 NEPHROLITHIASIS: Primary | ICD-10-CM

## 2021-12-23 PROCEDURE — 99213 OFFICE O/P EST LOW 20 MIN: CPT | Performed by: NURSE PRACTITIONER

## 2021-12-23 PROCEDURE — 1036F TOBACCO NON-USER: CPT | Performed by: NURSE PRACTITIONER

## 2021-12-23 PROCEDURE — 3008F BODY MASS INDEX DOCD: CPT | Performed by: NURSE PRACTITIONER

## 2021-12-23 RX ORDER — ASPIRIN 81 MG/1
TABLET ORAL
COMMUNITY
Start: 2020-01-24

## 2021-12-24 DIAGNOSIS — I10 ESSENTIAL HYPERTENSION: ICD-10-CM

## 2021-12-27 RX ORDER — AMLODIPINE BESYLATE 10 MG/1
TABLET ORAL
Qty: 90 TABLET | Refills: 2 | Status: SHIPPED | OUTPATIENT
Start: 2021-12-27

## 2021-12-29 ENCOUNTER — VBI (OUTPATIENT)
Dept: ADMINISTRATIVE | Facility: OTHER | Age: 36
End: 2021-12-29

## 2022-01-25 ENCOUNTER — TELEPHONE (OUTPATIENT)
Dept: DERMATOLOGY | Facility: CLINIC | Age: 37
End: 2022-01-25

## 2022-02-02 ENCOUNTER — OFFICE VISIT (OUTPATIENT)
Dept: FAMILY MEDICINE CLINIC | Facility: CLINIC | Age: 37
End: 2022-02-02
Payer: COMMERCIAL

## 2022-02-02 VITALS
OXYGEN SATURATION: 98 % | DIASTOLIC BLOOD PRESSURE: 76 MMHG | BODY MASS INDEX: 42.66 KG/M2 | SYSTOLIC BLOOD PRESSURE: 132 MMHG | HEIGHT: 72 IN | WEIGHT: 315 LBS | TEMPERATURE: 98.4 F | HEART RATE: 69 BPM

## 2022-02-02 DIAGNOSIS — K42.9 UMBILICAL HERNIA WITHOUT OBSTRUCTION AND WITHOUT GANGRENE: Primary | ICD-10-CM

## 2022-02-02 PROCEDURE — 99214 OFFICE O/P EST MOD 30 MIN: CPT | Performed by: FAMILY MEDICINE

## 2022-02-02 NOTE — PROGRESS NOTES
Ally Dallas 1985 male MRN: [id number]      ASSESSMENT/PLAN  Problem List Items Addressed This Visit     None      Visit Diagnoses     Umbilical hernia without obstruction and without gangrene    -  Primary    Relevant Orders    Ambulatory Referral to General Surgery        Worsening abdominal pain and bulging in setting of recent viral illness causing increased abdominal pressure with vomiting  Will refer to Gen Surg for further evaluation -- scheduled tomorrow for 10a with Dr Juju Alba  Discussed ED precautions including worsening pain, inability to pass gas/tolerate PO  Future Appointments   Date Time Provider Shaunna Todd   2/3/2022 10:00 AM Fadi Farnsworth MD [facility]          SUBJECTIVE  CC: Hernia      HPI:  Ally Dallas is a 43 y o  male who presents due to concern for hernia  Has had hernia problems since he was in his 25s due to lifting weights  For the past year, had an area above his umbilicus that would sometimes be painful, but he was able to always push it back in  Monday "felt like garbage" -- aches, nausea/vomiting, diarrhea, COVID(-) at home -- laid in bed for about 24 hours, but his symptoms started to improve  Yesterday, noted that his hernia pain won't go away  While in the shower, he could now feel his hernia inside his umbilicus  Today, he notes that it has progressed further  Having a lot more burping than normal after eating     Review of Systems   Gastrointestinal: Positive for abdominal pain, diarrhea, nausea and vomiting  Musculoskeletal: Positive for myalgias         Historical Information   The patient history was reviewed and updated as follows:    Past Medical History:   Diagnosis Date    Arthritis 2007    Bilateral carpal tunnel syndrome 10/31/2017    Cerumen debris on tympanic membrane, right 10/23/2016    Facial paresthesia 11/20/2013    GERD (gastroesophageal reflux disease)     Hypertension     Kidney stone     Nephrolithiasis 3/6/2020    Added automatically from request for surgery [id number]    Obesity Birth    Obstructive sleep apnea on CPAP 8/21/2012    Added automatically from request for surgery [id number]    Plantar fasciitis of right foot 4/11/2013    Prediabetes 1/30/2018    Added automatically from request for surgery [id number]    Tear of MCL (medial collateral ligament) of knee     Tear of medial meniscus of knee 2/9/2018     Past Surgical History:   Procedure Laterality Date    ABDOMINAL SURGERY      gastric sleeve 5/2018    BARIATRIC SURGERY      FL RETROGRADE PYELOGRAM  8/21/2020    FL RETROGRADE PYELOGRAM  9/1/2020    KNEE SURGERY Left     NV CYSTO/URETERO W/LITHOTRIPSY &INDWELL STENT INSRT Right 8/21/2020    Procedure: CYSTOSCOPY URETEROSCOPY WITH LITHOTRIPSY HOLMIUM LASER, RETROGRADE PYELOGRAM AND INSERTION STENT URETERAL;  Surgeon: Brenda Blancas MD;  [facility];  Service: Urology    NV CYSTO/URETERO W/LITHOTRIPSY &INDWELL STENT INSRT Right 9/1/2020    Procedure: CYSTOSCOPY URETEROSCOPY WITH LITHOTRIPSY HOLMIUM LASER, RETROGRADE PYELOGRAM AND INSERTION STENT URETERAL;  Surgeon: Brenda Blancas MD;  [facility];  Service: Urology     Family History   Problem Relation Age of Onset    No Known Problems Mother     Hypertension Father         Been on medication for years   Diabetes Father         Became diabetic after bypass surgery   Coronary artery disease Father     Arthritis Father         He has bad arthritis    Social History   Social History     Substance and Sexual Activity   Alcohol Use Yes    Alcohol/week: 0 0 standard drinks    Comment: I drink maybe one beer every 3 months         Social History     Substance and Sexual Activity   Drug Use Never     Social History     Tobacco Use   Smoking Status Never Smoker   Smokeless Tobacco Never Used       Medications:     Current Outpatient Medications:     amLODIPine (NORVASC) 10 mg tablet, TAKE 1 TABLET BY MOUTH EVERY DAY, Disp: 90 tablet, Rfl: 2    aspirin (Aspir-Low) 81 mg EC tablet, , Disp: , Rfl:     omeprazole (PriLOSEC) 20 mg delayed release capsule, TAKE 1 CAPSULE EVERY DAY, Disp: 90 capsule, Rfl: 1  Allergies   Allergen Reactions    Penicillins Anaphylaxis    Meloxicam Rash       OBJECTIVE    Vitals:   Vitals:    02/02/22 1509   BP: 132/76   Pulse: 69   Temp: 98 4 °F (36 9 °C)   SpO2: 98%   Weight: (!) 144 kg (318 lb)   Height: 6' (1 829 m)           Physical Exam  Vitals and nursing note reviewed  Constitutional:       General: He is not in acute distress  Appearance: Normal appearance  HENT:      Head: Normocephalic and atraumatic  Pulmonary:      Effort: Pulmonary effort is normal  No respiratory distress  Abdominal:          Comments: Tender to palpation over area as above, as well as in umbilicus; protrusion noted in umbilicus at 2:71   Remainder of abdomen soft, non-tender    Neurological:      General: No focal deficit present  Mental Status: He is alert     Psychiatric:         Mood and Affect: Mood normal                     Konrad Jacobs DO  [facility]   2/2/2022  4:07 PM

## 2022-02-02 NOTE — LETTER
February 2, 2022     Patient: Saúl Malcolm   YOB: 1985   Date of Visit: 2/2/2022       To Whom it May Concern:    Saúl Malcolm is under my professional care  He was seen in my office on 2/2/2022  Please excuse Mr Carmen Rodriguez from jury duty due to a medical concern requiring urgent attention on 02/03/2022  If you have any questions or concerns, please don't hesitate to call           Sincerely,          Santy Flores DO        CC: No Recipients

## 2022-02-03 ENCOUNTER — CONSULT (OUTPATIENT)
Dept: SURGERY | Facility: CLINIC | Age: 37
End: 2022-02-03
Payer: COMMERCIAL

## 2022-02-03 VITALS
TEMPERATURE: 98 F | DIASTOLIC BLOOD PRESSURE: 80 MMHG | RESPIRATION RATE: 16 BRPM | WEIGHT: 315 LBS | OXYGEN SATURATION: 96 % | BODY MASS INDEX: 42.66 KG/M2 | HEART RATE: 82 BPM | HEIGHT: 72 IN | SYSTOLIC BLOOD PRESSURE: 132 MMHG

## 2022-02-03 DIAGNOSIS — K42.9 UMBILICAL HERNIA WITHOUT OBSTRUCTION AND WITHOUT GANGRENE: ICD-10-CM

## 2022-02-03 PROCEDURE — 1036F TOBACCO NON-USER: CPT | Performed by: SURGERY

## 2022-02-03 PROCEDURE — 3008F BODY MASS INDEX DOCD: CPT | Performed by: SURGERY

## 2022-02-03 PROCEDURE — 99203 OFFICE O/P NEW LOW 30 MIN: CPT | Performed by: SURGERY

## 2022-02-03 NOTE — PROGRESS NOTES
Consult- General Surgery   Jie Cristina 43 y o  male MRN: [id number]  Unit/Bed#:  Encounter: [id number]    Assessment/Plan     Assessment:  Incarcerated supraumbilical hernia, improving  Morbid obesity  History of arthritis  History of GERD and status post gastric sleeve 2019  History of kidney stones  History hypertension  History of obstructive sleep apnea on CPAP  Plan:  I had on discussions with the patient regarding the importance of losing weight and successful repair of the hernia  The patient will be referred to the weight loss clinic and he will return to my office in 2 months for follow-up  History of Present Illness     HPI:  Jie Cristina is a 43 y o  male who presents to my office for evaluation of umbilical hernia  The patient is known to have umbilical hernia for a couple years, he noticed the bulge approximately a year and half, he was able to push it back in but 2 days ago he noted some nausea, vomiting and abdominal pain, subsequently he started complaining of moderate pain from the umbilicus, unable to push it back and  This morning he is feeling much better, he still have some discomfort and pain and nausea but not vomiting  I review CT scan of the abdomen and pelvis from 2019 which revealed supraumbilical hernia  The patient is status post gastric sleeve with gaining most of the weight back  Review of Systems: The rest of the review of system total of 10 were negative except for the HPI      Historical Information   Past Medical History:   Diagnosis Date    Arthritis 2007    Bilateral carpal tunnel syndrome 10/31/2017    Cerumen debris on tympanic membrane, right 10/23/2016    Facial paresthesia 11/20/2013    GERD (gastroesophageal reflux disease)     Hypertension     Kidney stone     Nephrolithiasis 3/6/2020    Added automatically from request for surgery [id number]    Obesity Birth    Obstructive sleep apnea on CPAP 8/21/2012    Added automatically from request for surgery [id number]    Plantar fasciitis of right foot 4/11/2013    Prediabetes 1/30/2018    Added automatically from request for surgery [id number]    Tear of MCL (medial collateral ligament) of knee     Tear of medial meniscus of knee 2/9/2018     Past Surgical History:   Procedure Laterality Date    ABDOMINAL SURGERY      gastric sleeve 5/2018    BARIATRIC SURGERY      FL RETROGRADE PYELOGRAM  8/21/2020    FL RETROGRADE PYELOGRAM  9/1/2020    KNEE SURGERY Left     NM CYSTO/URETERO W/LITHOTRIPSY &INDWELL STENT INSRT Right 8/21/2020    Procedure: CYSTOSCOPY URETEROSCOPY WITH LITHOTRIPSY HOLMIUM LASER, RETROGRADE PYELOGRAM AND INSERTION STENT URETERAL;  Surgeon: Ananda Cano MD;  [facility];  Service: Urology    NM CYSTO/URETERO W/LITHOTRIPSY &INDWELL STENT INSRT Right 9/1/2020    Procedure: CYSTOSCOPY URETEROSCOPY WITH LITHOTRIPSY HOLMIUM LASER, RETROGRADE PYELOGRAM AND INSERTION STENT URETERAL;  Surgeon: Ananda Cano MD;  [facility];  Service: Urology     Social History   Social History     Substance and Sexual Activity   Alcohol Use Yes    Alcohol/week: 0 0 standard drinks    Comment: I drink maybe one beer every 3 months         Social History     Substance and Sexual Activity   Drug Use Never     Social History     Tobacco Use   Smoking Status Never Smoker   Smokeless Tobacco Never Used     Family History: non-contributory    Meds/Allergies   all medications and allergies reviewed     Current Outpatient Medications:     amLODIPine (NORVASC) 10 mg tablet, TAKE 1 TABLET BY MOUTH EVERY DAY, Disp: 90 tablet, Rfl: 2    aspirin (Aspir-Low) 81 mg EC tablet, , Disp: , Rfl:     omeprazole (PriLOSEC) 20 mg delayed release capsule, TAKE 1 CAPSULE EVERY DAY, Disp: 90 capsule, Rfl: 1  Allergies   Allergen Reactions    Penicillins Anaphylaxis    Meloxicam Rash       Objective     Current Vitals:   Blood Pressure: 132/80 (02/03/22 0952)  Pulse: 82 (02/03/22 0952)  Temperature: 98 °F (36 7 °C) (02/03/22 0952)  Temp Source: Temporal (02/03/22 0952)  Respirations: 16 (02/03/22 0952)  Height: 6' (182 9 cm) (02/03/22 2792)  Weight - Scale: (!) 145 kg (319 lb) (02/03/22 0952)  SpO2: 96 % (02/03/22 6664)    Physical Exam  Vitals and nursing note reviewed  Constitutional:       General: He is not in acute distress  Appearance: He is obese  Cardiovascular:      Rate and Rhythm: Normal rate and regular rhythm  Heart sounds: No murmur heard  Pulmonary:      Effort: No respiratory distress  Breath sounds: Normal breath sounds  Abdominal:      Comments: Abdomen is obese, soft, nondistended and mild tenderness around the umbilicus without guarding or rebound  There is mildly tender fullness in the supraumbilical region  There is no obvious visceromegaly or mass palpable  Skin:     General: Skin is warm  Coloration: Skin is not jaundiced  Findings: No erythema or rash  Neurological:      Mental Status: He is alert and oriented to person, place, and time  Cranial Nerves: No cranial nerve deficit     Psychiatric:         Mood and Affect: Mood normal          Behavior: Behavior normal

## 2022-02-15 ENCOUNTER — TELEPHONE (OUTPATIENT)
Dept: BARIATRICS | Facility: CLINIC | Age: 37
End: 2022-02-15

## 2022-02-15 NOTE — TELEPHONE ENCOUNTER
Pt looking to transfer in to our program   Lm on his vm to schedule the UGI, then call us to schedule the consult with Jada(per Dr Danika James)

## 2022-02-18 ENCOUNTER — HOSPITAL ENCOUNTER (OUTPATIENT)
Dept: RADIOLOGY | Facility: HOSPITAL | Age: 37
Discharge: HOME | End: 2022-02-18
Attending: SURGERY
Payer: COMMERCIAL

## 2022-02-18 DIAGNOSIS — Z98.84 STATUS POST BARIATRIC SURGERY: ICD-10-CM

## 2022-02-18 PROCEDURE — 74240 X-RAY XM UPR GI TRC 1CNTRST: CPT

## 2022-03-15 ENCOUNTER — TELEPHONE (OUTPATIENT)
Dept: BARIATRICS | Facility: CLINIC | Age: 37
End: 2022-03-15

## 2022-03-15 NOTE — TELEPHONE ENCOUNTER
Lm on pt vm to schedule his consult  He had his UGI on 2/18 (he is transferring in to our program)    Per Dr Josef Frank, please schedule the consult with Daja Emerson ([facility] for 1 hr)

## 2022-04-02 DIAGNOSIS — Z98.84 BARIATRIC SURGERY STATUS: ICD-10-CM

## 2022-04-02 DIAGNOSIS — K21.9 GASTROESOPHAGEAL REFLUX DISEASE: ICD-10-CM

## 2022-04-04 RX ORDER — OMEPRAZOLE 20 MG/1
CAPSULE, DELAYED RELEASE ORAL
Qty: 90 CAPSULE | Refills: 1 | OUTPATIENT
Start: 2022-04-04 | End: 2022-07-08

## 2022-04-25 ENCOUNTER — OFFICE VISIT (OUTPATIENT)
Dept: URGENT CARE | Facility: CLINIC | Age: 37
End: 2022-04-25
Payer: COMMERCIAL

## 2022-04-25 VITALS — HEART RATE: 71 BPM | TEMPERATURE: 97.3 F | OXYGEN SATURATION: 98 % | RESPIRATION RATE: 14 BRPM

## 2022-04-25 DIAGNOSIS — B34.9 VIRAL INFECTION: Primary | ICD-10-CM

## 2022-04-25 PROCEDURE — 99213 OFFICE O/P EST LOW 20 MIN: CPT | Performed by: PHYSICIAN ASSISTANT

## 2022-04-25 NOTE — PATIENT INSTRUCTIONS
Upper Respiratory Infection   WHAT YOU NEED TO KNOW:   An upper respiratory infection is also called a cold  It can affect your nose, throat, ears, and sinuses  Cold symptoms are usually worst for the first 3 to 5 days  Most people get better in 7 to 14 days  You may continue to cough for 2 to 3 weeks  Colds are caused by viruses and do not get better with antibiotics  DISCHARGE INSTRUCTIONS:   Call your local emergency number (911 in the [address] [facility]) if:   · You have chest pain or trouble breathing  Return to the emergency department if:   · You have a fever over 102ºF (39ºC)  Call your doctor if:   · You have a low fever  · Your sore throat gets worse or you see white or yellow spots in your throat  · Your symptoms get worse after 3 to 5 days or are not better in 14 days  · You have a rash anywhere on your skin  · You have large, tender lumps in your neck  · You have thick, green, or yellow drainage from your nose  · You cough up thick yellow, green, or bloody mucus  · You have a bad earache  · You have questions or concerns about your condition or care  Medicines: You may need any of the following:  · Decongestants  help reduce nasal congestion and help you breathe more easily  If you take decongestant pills, they may make you feel restless or cause problems with your sleep  Do not use decongestant sprays for more than a few days  · Cough suppressants  help reduce coughing  Ask your healthcare provider which type of cough medicine is best for you  · NSAIDs , such as ibuprofen, help decrease swelling, pain, and fever  NSAIDs can cause stomach bleeding or kidney problems in certain people  If you take blood thinner medicine, always ask your healthcare provider if NSAIDs are safe for you  Always read the medicine label and follow directions  · Acetaminophen  decreases pain and fever  It is available without a doctor's order  Ask how much to take and how often to take it  Follow directions  Read the labels of all other medicines you are using to see if they also contain acetaminophen, or ask your doctor or pharmacist  Acetaminophen can cause liver damage if not taken correctly  Do not use more than 4 grams (4,000 milligrams) total of acetaminophen in one day  · Take your medicine as directed  Contact your healthcare provider if you think your medicine is not helping or if you have side effects  Tell him or her if you are allergic to any medicine  Keep a list of the medicines, vitamins, and herbs you take  Include the amounts, and when and why you take them  Bring the list or the pill bottles to follow-up visits  Carry your medicine list with you in case of an emergency  Self-care:   · Rest as much as possible  Slowly start to do more each day  · Drink more liquids as directed  Liquids will help thin and loosen mucus so you can cough it up  Liquids will also help prevent dehydration  Liquids that help prevent dehydration include water, fruit juice, and broth  Do not drink liquids that contain caffeine  Caffeine can increase your risk for dehydration  Ask your healthcare provider how much liquid to drink each day  · Soothe a sore throat  Gargle with warm salt water  Make salt water by dissolving ¼ teaspoon salt in 1 cup warm water  You may also suck on hard candy or throat lozenges  You may use a sore throat spray  · Use a humidifier or vaporizer  Use a cool mist humidifier or a vaporizer to increase air moisture in your home  This may make it easier for you to breathe and help decrease your cough  · Use saline nasal drops as directed  These help relieve congestion  · Apply petroleum-based jelly around the outside of your nostrils  This can decrease irritation from blowing your nose  · Do not smoke  Nicotine and other chemicals in cigarettes and cigars can make your symptoms worse  They can also cause infections such as bronchitis or pneumonia   Ask your healthcare provider for information if you currently smoke and need help to quit  E-cigarettes or smokeless tobacco still contain nicotine  Talk to your healthcare provider before you use these products  Prevent a cold:   · Wash your hands often  Use soap and water every time you wash your hands  Rub your soapy hands together, lacing your fingers  Use the fingers of one hand to scrub under the nails of the other hand  Wash for at least 20 seconds  Rinse with warm, running water for several seconds  Then dry your hands  Use germ-killing gel if soap and water are not available  Do not touch your eyes or mouth without washing your hands first          · Cover a sneeze or cough  Use a tissue that covers your mouth and nose  Put the used tissue in the trash right away  Use the bend of your arm if a tissue is not available  Wash your hands well with soap and water or use a hand   Do not stand close to anyone who is sneezing or coughing  · Try to stay away from others while you are sick  This is especially important during the first 2 to 3 days when the virus is more easily spread  Wait until a fever, cough, or other symptoms are gone before you return to work or other regular activities  · Do not share items while you are sick  This includes food, drinks, eating utensils, and dishes  Follow up with your doctor as directed:  Write down your questions so you remember to ask them during your visits  © Copyright [employer] 2022 Information is for End User's use only and may not be sold, redistributed or otherwise used for commercial purposes  All illustrations and images included in CareNotes® are the copyrighted property of [employer]  or Sidney Christine  The above information is an  only  It is not intended as medical advice for individual conditions or treatments   Talk to your doctor, nurse or pharmacist before following any medical regimen to see if it is safe and effective for you

## 2022-04-25 NOTE — PROGRESS NOTES
[facility]        NAME: Alxeandre Mauricio is a 42 y o  male  : 1985    MRN: [id number]  DATE: 2022  TIME: 12:58 PM    Assessment and Plan   Viral infection [B34 9]  1  Viral infection           Patient Instructions   Patient Instructions     Upper Respiratory Infection   WHAT YOU NEED TO KNOW:   An upper respiratory infection is also called a cold  It can affect your nose, throat, ears, and sinuses  Cold symptoms are usually worst for the first 3 to 5 days  Most people get better in 7 to 14 days  You may continue to cough for 2 to 3 weeks  Colds are caused by viruses and do not get better with antibiotics  DISCHARGE INSTRUCTIONS:   Call your local emergency number (911 in the [address] [place] [address],[address]) if:   · You have chest pain or trouble breathing  Return to the emergency department if:   · You have a fever over 102ºF (39ºC)  Call your doctor if:   · You have a low fever  · Your sore throat gets worse or you see white or yellow spots in your throat  · Your symptoms get worse after 3 to 5 days or are not better in 14 days  · You have a rash anywhere on your skin  · You have large, tender lumps in your neck  · You have thick, green, or yellow drainage from your nose  · You cough up thick yellow, green, or bloody mucus  · You have a bad earache  · You have questions or concerns about your condition or care  Medicines: You may need any of the following:  · Decongestants  help reduce nasal congestion and help you breathe more easily  If you take decongestant pills, they may make you feel restless or cause problems with your sleep  Do not use decongestant sprays for more than a few days  · Cough suppressants  help reduce coughing  Ask your healthcare provider which type of cough medicine is best for you  · NSAIDs , such as ibuprofen, help decrease swelling, pain, and fever  NSAIDs can cause stomach bleeding or kidney problems in certain people   If you take blood thinner medicine, always ask your healthcare provider if NSAIDs are safe for you  Always read the medicine label and follow directions  · Acetaminophen  decreases pain and fever  It is available without a doctor's order  Ask how much to take and how often to take it  Follow directions  Read the labels of all other medicines you are using to see if they also contain acetaminophen, or ask your doctor or pharmacist  Acetaminophen can cause liver damage if not taken correctly  Do not use more than 4 grams (4,000 milligrams) total of acetaminophen in one day  · Take your medicine as directed  Contact your healthcare provider if you think your medicine is not helping or if you have side effects  Tell him or her if you are allergic to any medicine  Keep a list of the medicines, vitamins, and herbs you take  Include the amounts, and when and why you take them  Bring the list or the pill bottles to follow-up visits  Carry your medicine list with you in case of an emergency  Self-care:   · Rest as much as possible  Slowly start to do more each day  · Drink more liquids as directed  Liquids will help thin and loosen mucus so you can cough it up  Liquids will also help prevent dehydration  Liquids that help prevent dehydration include water, fruit juice, and broth  Do not drink liquids that contain caffeine  Caffeine can increase your risk for dehydration  Ask your healthcare provider how much liquid to drink each day  · Soothe a sore throat  Gargle with warm salt water  Make salt water by dissolving ¼ teaspoon salt in 1 cup warm water  You may also suck on hard candy or throat lozenges  You may use a sore throat spray  · Use a humidifier or vaporizer  Use a cool mist humidifier or a vaporizer to increase air moisture in your home  This may make it easier for you to breathe and help decrease your cough  · Use saline nasal drops as directed  These help relieve congestion      · Apply petroleum-based jelly around the outside of your nostrils  This can decrease irritation from blowing your nose  · Do not smoke  Nicotine and other chemicals in cigarettes and cigars can make your symptoms worse  They can also cause infections such as bronchitis or pneumonia  Ask your healthcare provider for information if you currently smoke and need help to quit  E-cigarettes or smokeless tobacco still contain nicotine  Talk to your healthcare provider before you use these products  Prevent a cold:   · Wash your hands often  Use soap and water every time you wash your hands  Rub your soapy hands together, lacing your fingers  Use the fingers of one hand to scrub under the nails of the other hand  Wash for at least 20 seconds  Rinse with warm, running water for several seconds  Then dry your hands  Use germ-killing gel if soap and water are not available  Do not touch your eyes or mouth without washing your hands first          · Cover a sneeze or cough  Use a tissue that covers your mouth and nose  Put the used tissue in the trash right away  Use the bend of your arm if a tissue is not available  Wash your hands well with soap and water or use a hand   Do not stand close to anyone who is sneezing or coughing  · Try to stay away from others while you are sick  This is especially important during the first 2 to 3 days when the virus is more easily spread  Wait until a fever, cough, or other symptoms are gone before you return to work or other regular activities  · Do not share items while you are sick  This includes food, drinks, eating utensils, and dishes  Follow up with your doctor as directed:  Write down your questions so you remember to ask them during your visits  © Copyright [employer] 2022 Information is for End User's use only and may not be sold, redistributed or otherwise used for commercial purposes   All illustrations and images included in CareNotes® are the copyrighted property of [employer]  or [facility]  The above information is an  only  It is not intended as medical advice for individual conditions or treatments  Talk to your doctor, nurse or pharmacist before following any medical regimen to see if it is safe and effective for you  Follow up with PCP in 3-5 days  Proceed to  ER if symptoms worsen  Chief Complaint     Chief Complaint   Patient presents with    Hearing Loss     Started with left then to right  Couldn't get ears to pop on plane  Was seen in ER and received toradol  Taking clindamycin  Had stabbing pain in left ear that radiates to jaw  Congestion, runny/stuffy nose  Slight cough  Flu/covid vaccines taken   Eye Problem     congestion, woke with with eye sticky right eye, redness noted  History of Present Illness       Patient presents with congestion and runny nose for the past 5 days  States 4 days ago started to get left-sided ear pain and pressure after being on a flight down [place]  He was seen in the ER to and was placed on clindamycin for an ear infection  Then the flight back he developed the same feeling in the right ear  Denies fevers, shortness of breath, difficulty breathing  Review of Systems   Review of Systems   Constitutional: Negative for chills, fatigue and fever  HENT: Positive for congestion, ear pain, hearing loss and rhinorrhea  Negative for ear discharge, postnasal drip, sinus pressure, sinus pain and sore throat  Respiratory: Positive for cough  Negative for chest tightness, shortness of breath and wheezing  Cardiovascular: Negative for chest pain and palpitations  Musculoskeletal: Negative for arthralgias and myalgias  Neurological: Negative for weakness  Psychiatric/Behavioral: Negative for confusion           Current Medications       Current Outpatient Medications:     amLODIPine (NORVASC) 10 mg tablet, TAKE 1 TABLET BY MOUTH EVERY DAY, Disp: 90 tablet, Rfl: 2    aspirin (Aspir-Low) 81 mg EC tablet, , Disp: , Rfl:     omeprazole (PriLOSEC) 20 mg delayed release capsule, TAKE 1 CAPSULE BY MOUTH EVERY DAY, Disp: 90 capsule, Rfl: 1    Current Allergies     Allergies as of 04/25/2022 - Reviewed 02/03/2022   Allergen Reaction Noted    Penicillins Anaphylaxis 05/06/2019    Meloxicam Rash 10/04/2017            The following portions of the patient's history were reviewed and updated as appropriate: allergies, current medications, past family history, past medical history, past social history, past surgical history and problem list      Past Medical History:   Diagnosis Date    Arthritis 2007    Bilateral carpal tunnel syndrome 10/31/2017    Cerumen debris on tympanic membrane, right 10/23/2016    Facial paresthesia 11/20/2013    GERD (gastroesophageal reflux disease)     Hypertension     Kidney stone     Nephrolithiasis 3/6/2020    Added automatically from request for surgery [id number]    Obesity Birth    Obstructive sleep apnea on CPAP 8/21/2012    Added automatically from request for surgery [id number]    Plantar fasciitis of right foot 4/11/2013    Prediabetes 1/30/2018    Added automatically from request for surgery [id number]    Tear of MCL (medial collateral ligament) of knee     Tear of medial meniscus of knee 2/9/2018       Past Surgical History:   Procedure Laterality Date    ABDOMINAL SURGERY      gastric sleeve 5/2018    BARIATRIC SURGERY      FL RETROGRADE PYELOGRAM  8/21/2020    FL RETROGRADE PYELOGRAM  9/1/2020    KNEE SURGERY Left     MT CYSTO/URETERO W/LITHOTRIPSY &INDWELL STENT INSRT Right 8/21/2020    Procedure: CYSTOSCOPY URETEROSCOPY WITH LITHOTRIPSY HOLMIUM LASER, RETROGRADE PYELOGRAM AND INSERTION STENT URETERAL;  Surgeon: Suzanne Laureano MD;  Location[facility];  Service: Urology    MT CYSTO/URETERO W/LITHOTRIPSY &INDWELL STENT INSRT Right 9/1/2020    Procedure: CYSTOSCOPY URETEROSCOPY WITH LITHOTRIPSY HOLMIUM LASER, RETROGRADE PYELOGRAM AND INSERTION STENT URETERAL;  Surgeon: Evelia Patel MD;  Location: [facility];  Service: Urology       Family History   Problem Relation Age of Onset    No Known Problems Mother     Hypertension Father         Been on medication for years   Diabetes Father         Became diabetic after bypass surgery   Coronary artery disease Father     Arthritis Father         He has bad arthritis    Medications have been verified  Objective   Pulse 71   Temp (!) 97 3 °F (36 3 °C)   Resp 14   SpO2 98%        Physical Exam     Physical Exam  Constitutional:       General: He is not in acute distress  Appearance: Normal appearance  He is not ill-appearing or diaphoretic  HENT:      Right Ear: Tympanic membrane, ear canal and external ear normal  There is no impacted cerumen  Left Ear: Tympanic membrane, ear canal and external ear normal  There is no impacted cerumen  Ears:      Comments: Mild to moderate amount of cerumen left ear     Nose: Nose normal       Mouth/Throat:      Mouth: Mucous membranes are moist       Pharynx: Oropharynx is clear  Eyes:      Conjunctiva/sclera: Conjunctivae normal    Cardiovascular:      Rate and Rhythm: Normal rate and regular rhythm  Heart sounds: Normal heart sounds  Pulmonary:      Effort: Pulmonary effort is normal       Breath sounds: Normal breath sounds  Skin:     General: Skin is warm and dry  Neurological:      Mental Status: He is alert     Psychiatric:         Mood and Affect: Mood normal          Behavior: Behavior normal

## 2022-04-26 PROBLEM — Z48.815 ENCOUNTER FOR SURGICAL AFTERCARE FOLLOWING SURGERY OF DIGESTIVE SYSTEM: Status: ACTIVE | Noted: 2022-04-26

## 2022-04-26 NOTE — ASSESSMENT & PLAN NOTE
-Not using CPAP; he agrees to schedule with sleep medicine  -Discussed risks of untreated sleep apnea such as sudden cardiac death by arrhythmia, uncontrolled hypertension, difficulty with weight loss, decreased quality sleep, increased insulin resistance, and stroke

## 2022-04-26 NOTE — ASSESSMENT & PLAN NOTE
-s/p Vertical Sleeve Gastrectomy in May 2018 at [facility]  Weight regain, desires to get back on track  He was provided copy of bariatric manual  He will be referred to surgical RD and consult with JALIL  He is not interested in a surgical revision  He will be scheduled for EGD to assess his esophagus and r/o Mayer's  Initial: 394lbs  Current: 316 8lbs  Stephen: 265lbs 1 year s/p surgery  Current BMI is Body mass index is 44 11 kg/m²  · Tolerating a regular diet-yes  · Eating at least 60 grams of protein per day-yes  · Following 30/60 minute rule with liquids-yes  · Drinking at least 64 ounces of fluid per day-no; advised he increase  · Drinking carbonated beverages-no  · Sufficient exercise-just restarted at gym  · Using NSAIDs regularly-no  · Using nicotine-no  · Using alcohol-no   Advised about the risks of alcohol s/p bariatric surgery and recommend avoiding all alcohol

## 2022-04-26 NOTE — ASSESSMENT & PLAN NOTE
-On omeprazole 20mg   -Advised avoidance of food triggers and gastric irritants, follow anti-reflux diet and lifestyle measures  -EGD to assess esophagus and r/o Mayer's  -UGI shows severe GERD, but patient feels well controlled if he takes omeprazole 20mg daily and avoids eating late at night   Discussed possible conversion to RYGB but he is not interested

## 2022-04-29 ENCOUNTER — PREP FOR PROCEDURE (OUTPATIENT)
Dept: BARIATRICS | Facility: CLINIC | Age: 37
End: 2022-04-29

## 2022-04-29 ENCOUNTER — OFFICE VISIT (OUTPATIENT)
Dept: BARIATRICS | Facility: CLINIC | Age: 37
End: 2022-04-29
Payer: COMMERCIAL

## 2022-04-29 VITALS
WEIGHT: 315 LBS | SYSTOLIC BLOOD PRESSURE: 126 MMHG | DIASTOLIC BLOOD PRESSURE: 84 MMHG | HEIGHT: 71 IN | TEMPERATURE: 96.9 F | RESPIRATION RATE: 12 BRPM | BODY MASS INDEX: 44.1 KG/M2 | HEART RATE: 75 BPM

## 2022-04-29 DIAGNOSIS — N20.0 NEPHROLITHIASIS: ICD-10-CM

## 2022-04-29 DIAGNOSIS — Z48.815 ENCOUNTER FOR SURGICAL AFTERCARE FOLLOWING SURGERY OF DIGESTIVE SYSTEM: Primary | ICD-10-CM

## 2022-04-29 DIAGNOSIS — E66.01 OBESITY, CLASS III, BMI 40-49.9 (MORBID OBESITY) (HCC): ICD-10-CM

## 2022-04-29 DIAGNOSIS — G47.30 SLEEP APNEA, UNSPECIFIED TYPE: ICD-10-CM

## 2022-04-29 DIAGNOSIS — K21.9 GASTROESOPHAGEAL REFLUX DISEASE, UNSPECIFIED WHETHER ESOPHAGITIS PRESENT: ICD-10-CM

## 2022-04-29 DIAGNOSIS — K91.2 POSTSURGICAL MALABSORPTION: ICD-10-CM

## 2022-04-29 DIAGNOSIS — I10 ESSENTIAL HYPERTENSION: ICD-10-CM

## 2022-04-29 DIAGNOSIS — E66.01 MORBID OBESITY (HCC): Primary | ICD-10-CM

## 2022-04-29 PROCEDURE — 99204 OFFICE O/P NEW MOD 45 MIN: CPT | Performed by: PHYSICIAN ASSISTANT

## 2022-04-29 RX ORDER — CLINDAMYCIN HYDROCHLORIDE 300 MG/1
CAPSULE ORAL
COMMUNITY
Start: 2022-04-23 | End: 2022-07-26

## 2022-04-29 NOTE — ASSESSMENT & PLAN NOTE
-Hx of kidney stones; advised he push hydrating fluids to goal of 80oz+  -Avoid high oxalate foods and tannins  -Take calcium citrate with meals

## 2022-04-29 NOTE — ASSESSMENT & PLAN NOTE
-At risk for malabsorption of vitamins/minerals secondary to malabsorption and restriction of intake from bariatric surgery  -NOT Currently taking adequate postop bariatric surgery vitamin supplementation  -Recommended bariatric MVI and calcium - to discuss further with RD  -CBC/Metabolic panel ordered  -Patient received education about the importance of adhering to a lifelong supplementation regimen to avoid vitamin/mineral deficiencies

## 2022-04-29 NOTE — PATIENT INSTRUCTIONS
GOALS:   · Continued/Maintain healthy weight loss with good nutrition intakes  · Adequate hydration with at least 64oz  fluid intake  · Normal vitamin and mineral levels  · Exercise as tolerated  · Consult with Medical Weight Management   · Schedule EGD  · Follow up with surgical RD   · Follow up with sleep medicine    · Follow-up in 6 months  We kindly ask that your arrive 15 minutes before your scheduled appointment time with your provider to allow our staff to room you, get your vital signs and update your chart  · Follow diet as discussed  · Get lab work done in the next 2 weeks  You have been given a lab slip today  Please call the office if you need a replacement  It is recommended to check with your insurance BEFORE getting labs done to make sure they are covered by your policy  Also, please check with your PCP and other providers before getting labs to avoid duplicate labs  Make sure to HOLD any multivitamins that may contain biotin and any biotin supplements FOR 5 DAYS before any labs since it can affect the results  · Follow vitamin and mineral recommendations as reviewed with you  · Call our office if you have any problems with abdominal pain especially associated with fever, chills, nausea, vomiting or any other concerns  · All  Post-bariatric surgery patients should be aware that very small quantities of any alcohol can cause impairment and it is very possible not to feel the effect  The effect can be in the system for several hours  It is also a stomach irritant  · It is advised to AVOID alcohol, Nonsteroidal antiinflammatory drugs (NSAIDS) and nicotine of all forms   Any of these can cause stomach irritation/pain

## 2022-04-29 NOTE — PROGRESS NOTES
Assessment/Plan:    Encounter for surgical aftercare following surgery of digestive system  -s/p Vertical Sleeve Gastrectomy in May 2018 at [facility]  Weight regain, desires to get back on track  He was provided copy of bariatric manual  He will be referred to surgical RD and consult with JALIL  He is not interested in a surgical revision  He will be scheduled for EGD to assess his esophagus and r/o Mayer's  Initial: 394lbs  Current: 316 8lbs  Stephen: 265lbs 1 year s/p surgery  Current BMI is Body mass index is 44 11 kg/m²  · Tolerating a regular diet-yes  · Eating at least 60 grams of protein per day-yes  · Following 30/60 minute rule with liquids-yes  · Drinking at least 64 ounces of fluid per day-no; advised he increase  · Drinking carbonated beverages-no  · Sufficient exercise-just restarted at gym  · Using NSAIDs regularly-no  · Using nicotine-no  · Using alcohol-no  Advised about the risks of alcohol s/p bariatric surgery and recommend avoiding all alcohol      GERD (gastroesophageal reflux disease)  -On omeprazole 20mg   -Advised avoidance of food triggers and gastric irritants, follow anti-reflux diet and lifestyle measures  -EGD to assess esophagus and r/o Mayer's  -UGI shows severe GERD, but patient feels well controlled if he takes omeprazole 20mg daily and avoids eating late at night  Discussed possible conversion to RYGB but he is not interested      Sleep apnea  -Not using CPAP; he agrees to schedule with sleep medicine  -Discussed risks of untreated sleep apnea such as sudden cardiac death by arrhythmia, uncontrolled hypertension, difficulty with weight loss, decreased quality sleep, increased insulin resistance, and stroke        Essential hypertension  -on amlodipine  -continue monitoring and management with PCP    Postsurgical malabsorption  -At risk for malabsorption of vitamins/minerals secondary to malabsorption and restriction of intake from bariatric surgery  -NOT Currently taking adequate postop bariatric surgery vitamin supplementation  -Recommended bariatric MVI and calcium - to discuss further with RD  -CBC/Metabolic panel ordered  -Patient received education about the importance of adhering to a lifelong supplementation regimen to avoid vitamin/mineral deficiencies       Nephrolithiasis  -Hx of kidney stones; advised he push hydrating fluids to goal of 80oz+  -Avoid high oxalate foods and tannins  -Take calcium citrate with meals       Diagnoses and all orders for this visit:    Encounter for surgical aftercare following surgery of digestive system    Gastroesophageal reflux disease, unspecified whether esophagitis present    Sleep apnea, unspecified type    Essential hypertension    Postsurgical malabsorption  -     CBC and differential; Future  -     Comprehensive metabolic panel; Future  -     Folate; Future  -     Hemoglobin A1C; Future  -     Iron Panel (Includes Ferritin, Iron Sat%, Iron, and TIBC); Future  -     Zinc; Future  -     Vitamin D 25 hydroxy; Future  -     Vitamin B12; Future  -     Vitamin B1, whole blood; Future  -     Vitamin A; Future  -     PTH, intact; Future  -     Lipid panel; Future    Obesity, Class III, BMI 40-49 9 (morbid obesity) (HCC)  -     Hemoglobin A1C; Future  -     Lipid panel; Future    Nephrolithiasis    Other orders  -     clindamycin (CLEOCIN) 300 MG capsule; TAKE 1 CAPSULE BY MOUTH EVERY 8 HOURS FOR 10 DAYS          Subjective:      Patient ID: Eamon Landin is a 42 y o  male  -s/p Vertical Sleeve Gastrectomy in May 2018 at [facility]  He presents to the office to establish care and for weight regain  He did very well s/p surgery - lost 129lbs and then gradually over the last several years regained 52lbs  He works as  and has a Has 1year old and child on way in October  Used to play football and weight lift, has bad knees and back  Just started back at the gym  Would like to get on track   Does not wish to have revision surgery  Has reflux if misses his omeprazole 20mg daily and if he eats within an hour of laying down he has severe reflux and regurgitation  He saw Dr Yadira Ramos in February - he has Incarcerated supraumbilical hernia  He referred him to weight management for weight loss and will continue to follow up  Hx of kidney stones  UGI 02/18/22: "Postoperative changes from sleeve gastrectomy, Hiatal hernia, Severe gastroesophageal reflux"       - 5am to 1pm or 7-3  Diet Recall:   B - bowl of cereal or banana or toast  L - leftovers  D - chicken or turkey or pork with frozen veggies and rice or pasta  Snacks - sometimes ice cream    Fluids - limited fluids, 1  daily    The following portions of the patient's history were reviewed and updated as appropriate: allergies, current medications, past family history, past medical history, past social history, past surgical history and problem list     Review of Systems   Constitutional: Negative for chills and fever  Unexpected weight change: planned weight loss  HENT: Positive for congestion and postnasal drip  Negative for trouble swallowing  Respiratory: Positive for cough  Negative for shortness of breath  Cardiovascular: Negative for chest pain and palpitations  Gastrointestinal: Negative for abdominal pain, constipation, diarrhea, nausea and vomiting         +reflux if misses PPI   Musculoskeletal: Positive for arthralgias and back pain  Neurological: Negative for dizziness  Psychiatric/Behavioral:        Denies anxiety and depression         Objective:      /84 (BP Location: Left arm, Patient Position: Sitting, Cuff Size: Large)   Pulse 75   Temp (!) 96 9 °F (36 1 °C) (Tympanic)   Resp 12   Ht 5' 11 06" (1 805 m)   Wt (!) 144 kg (316 lb 12 8 oz)   BMI 44 11 kg/m²     Colonoscopy-Not applicable       Physical Exam  Vitals reviewed  Constitutional:       General: He is not in acute distress  Appearance: He is well-developed  HENT:      Head: Normocephalic and atraumatic  Eyes:      General: No scleral icterus  Cardiovascular:      Rate and Rhythm: Normal rate and regular rhythm  Pulmonary:      Effort: Pulmonary effort is normal  No respiratory distress  Abdominal:      General: There is no distension  Palpations: Abdomen is soft  Tenderness: There is no abdominal tenderness  Hernia: A hernia is present  Comments: Mildly tender over umbilicus, no rebound or guarding   Skin:     General: Skin is warm and dry  Neurological:      Mental Status: He is alert and oriented to person, place, and time  Psychiatric:         Mood and Affect: Mood normal          Behavior: Behavior normal            BARRIERS: none identified    GOALS:   · Continued/Maintain healthy weight loss with good nutrition intakes  · Adequate hydration with at least 64oz  fluid intake  · Normal vitamin and mineral levels  · Exercise as tolerated  · Consult with Medical Weight Management   · Schedule EGD  · Follow up with surgical RD   · F/u with sleep med    · Follow-up in 6 months  We kindly ask that your arrive 15 minutes before your scheduled appointment time with your provider to allow our staff to room you, get your vital signs and update your chart  · Follow diet as discussed  · Get lab work done in the next 2 weeks  You have been given a lab slip today  Please call the office if you need a replacement  It is recommended to check with your insurance BEFORE getting labs done to make sure they are covered by your policy  Also, please check with your PCP and other providers before getting labs to avoid duplicate labs  Make sure to HOLD any multivitamins that may contain biotin and any biotin supplements FOR 5 DAYS before any labs since it can affect the results  · Follow vitamin and mineral recommendations as reviewed with you      · Call our office if you have any problems with abdominal pain especially associated with fever, chills, nausea, vomiting or any other concerns  · All  Post-bariatric surgery patients should be aware that very small quantities of any alcohol can cause impairment and it is very possible not to feel the effect  The effect can be in the system for several hours  It is also a stomach irritant  · It is advised to AVOID alcohol, Nonsteroidal antiinflammatory drugs (NSAIDS) and nicotine of all forms   Any of these can cause stomach irritation/pain

## 2022-06-13 ENCOUNTER — TELEPHONE (OUTPATIENT)
Dept: SURGERY | Facility: CLINIC | Age: 37
End: 2022-06-13

## 2022-06-13 NOTE — TELEPHONE ENCOUNTER
LM FOR PT TO CALL BACK TO RESCHED 7/8 APPT   PT SCHED THIS APPT VIA [facility] AND IS ONLY 1 HOUR AFTER HIS ENDOSCOPY, THIS IS TIME CONFLICTING, OUR APPT NEEDS TO BE RESCHED

## 2022-06-25 ENCOUNTER — APPOINTMENT (OUTPATIENT)
Dept: LAB | Facility: CLINIC | Age: 37
End: 2022-06-25
Payer: COMMERCIAL

## 2022-06-25 DIAGNOSIS — K91.2 POSTSURGICAL MALABSORPTION: ICD-10-CM

## 2022-06-25 DIAGNOSIS — E66.01 OBESITY, CLASS III, BMI 40-49.9 (MORBID OBESITY) (HCC): ICD-10-CM

## 2022-06-25 LAB
25(OH)D3 SERPL-MCNC: 15.4 NG/ML (ref 30–100)
ALBUMIN SERPL BCP-MCNC: 3.7 G/DL (ref 3.5–5)
ALP SERPL-CCNC: 89 U/L (ref 46–116)
ALT SERPL W P-5'-P-CCNC: 33 U/L (ref 12–78)
ANION GAP SERPL CALCULATED.3IONS-SCNC: 4 MMOL/L (ref 4–13)
AST SERPL W P-5'-P-CCNC: 18 U/L (ref 5–45)
BASOPHILS # BLD AUTO: 0.05 THOUSANDS/ΜL (ref 0–0.1)
BASOPHILS NFR BLD AUTO: 1 % (ref 0–1)
BILIRUB SERPL-MCNC: 0.83 MG/DL (ref 0.2–1)
BUN SERPL-MCNC: 11 MG/DL (ref 5–25)
CALCIUM SERPL-MCNC: 9.1 MG/DL (ref 8.3–10.1)
CHLORIDE SERPL-SCNC: 105 MMOL/L (ref 100–108)
CHOLEST SERPL-MCNC: 151 MG/DL
CO2 SERPL-SCNC: 30 MMOL/L (ref 21–32)
CREAT SERPL-MCNC: 0.8 MG/DL (ref 0.6–1.3)
EOSINOPHIL # BLD AUTO: 0.13 THOUSAND/ΜL (ref 0–0.61)
EOSINOPHIL NFR BLD AUTO: 2 % (ref 0–6)
ERYTHROCYTE [DISTWIDTH] IN BLOOD BY AUTOMATED COUNT: 14.2 % (ref 11.6–15.1)
EST. AVERAGE GLUCOSE BLD GHB EST-MCNC: 114 MG/DL
FERRITIN SERPL-MCNC: 14 NG/ML (ref 8–388)
FOLATE SERPL-MCNC: 13.9 NG/ML (ref 3.1–17.5)
GFR SERPL CREATININE-BSD FRML MDRD: 112 ML/MIN/1.73SQ M
GLUCOSE P FAST SERPL-MCNC: 90 MG/DL (ref 65–99)
HBA1C MFR BLD: 5.6 %
HCT VFR BLD AUTO: 43.9 % (ref 36.5–49.3)
HDLC SERPL-MCNC: 31 MG/DL
HGB BLD-MCNC: 14.4 G/DL (ref 12–17)
IMM GRANULOCYTES # BLD AUTO: 0.02 THOUSAND/UL (ref 0–0.2)
IMM GRANULOCYTES NFR BLD AUTO: 0 % (ref 0–2)
IRON SATN MFR SERPL: 30 % (ref 20–50)
IRON SERPL-MCNC: 98 UG/DL (ref 65–175)
LDLC SERPL CALC-MCNC: 97 MG/DL (ref 0–100)
LYMPHOCYTES # BLD AUTO: 1.76 THOUSANDS/ΜL (ref 0.6–4.47)
LYMPHOCYTES NFR BLD AUTO: 29 % (ref 14–44)
MCH RBC QN AUTO: 28.9 PG (ref 26.8–34.3)
MCHC RBC AUTO-ENTMCNC: 32.8 G/DL (ref 31.4–37.4)
MCV RBC AUTO: 88 FL (ref 82–98)
MONOCYTES # BLD AUTO: 0.44 THOUSAND/ΜL (ref 0.17–1.22)
MONOCYTES NFR BLD AUTO: 7 % (ref 4–12)
NEUTROPHILS # BLD AUTO: 3.73 THOUSANDS/ΜL (ref 1.85–7.62)
NEUTS SEG NFR BLD AUTO: 61 % (ref 43–75)
NONHDLC SERPL-MCNC: 120 MG/DL
NRBC BLD AUTO-RTO: 0 /100 WBCS
PLATELET # BLD AUTO: 238 THOUSANDS/UL (ref 149–390)
PMV BLD AUTO: 10.8 FL (ref 8.9–12.7)
POTASSIUM SERPL-SCNC: 4.3 MMOL/L (ref 3.5–5.3)
PROT SERPL-MCNC: 7.3 G/DL (ref 6.4–8.2)
PTH-INTACT SERPL-MCNC: 48.8 PG/ML (ref 18.4–80.1)
RBC # BLD AUTO: 4.99 MILLION/UL (ref 3.88–5.62)
SODIUM SERPL-SCNC: 139 MMOL/L (ref 136–145)
TIBC SERPL-MCNC: 322 UG/DL (ref 250–450)
TRIGL SERPL-MCNC: 115 MG/DL
VIT B12 SERPL-MCNC: 348 PG/ML (ref 100–900)
WBC # BLD AUTO: 6.13 THOUSAND/UL (ref 4.31–10.16)

## 2022-06-25 PROCEDURE — 82746 ASSAY OF FOLIC ACID SERUM: CPT

## 2022-06-25 PROCEDURE — 80061 LIPID PANEL: CPT

## 2022-06-25 PROCEDURE — 82607 VITAMIN B-12: CPT

## 2022-06-25 PROCEDURE — 36415 COLL VENOUS BLD VENIPUNCTURE: CPT

## 2022-06-25 PROCEDURE — 84590 ASSAY OF VITAMIN A: CPT

## 2022-06-25 PROCEDURE — 85025 COMPLETE CBC W/AUTO DIFF WBC: CPT

## 2022-06-25 PROCEDURE — 83540 ASSAY OF IRON: CPT

## 2022-06-25 PROCEDURE — 84425 ASSAY OF VITAMIN B-1: CPT

## 2022-06-25 PROCEDURE — 83550 IRON BINDING TEST: CPT

## 2022-06-25 PROCEDURE — 80053 COMPREHEN METABOLIC PANEL: CPT

## 2022-06-25 PROCEDURE — 82728 ASSAY OF FERRITIN: CPT

## 2022-06-25 PROCEDURE — 82306 VITAMIN D 25 HYDROXY: CPT

## 2022-06-25 PROCEDURE — 83036 HEMOGLOBIN GLYCOSYLATED A1C: CPT

## 2022-06-25 PROCEDURE — 84630 ASSAY OF ZINC: CPT

## 2022-06-25 PROCEDURE — 83970 ASSAY OF PARATHORMONE: CPT

## 2022-06-28 LAB — ZINC SERPL-MCNC: 85 UG/DL (ref 44–115)

## 2022-06-29 LAB — VIT B1 BLD-SCNC: 143.2 NMOL/L (ref 66.5–200)

## 2022-06-30 LAB — VIT A SERPL-MCNC: 27.1 UG/DL (ref 18.9–57.3)

## 2022-07-07 RX ORDER — SODIUM CHLORIDE, SODIUM LACTATE, POTASSIUM CHLORIDE, CALCIUM CHLORIDE 600; 310; 30; 20 MG/100ML; MG/100ML; MG/100ML; MG/100ML
100 INJECTION, SOLUTION INTRAVENOUS CONTINUOUS
Status: CANCELLED | OUTPATIENT
Start: 2022-07-07

## 2022-07-08 ENCOUNTER — ANESTHESIA (OUTPATIENT)
Dept: GASTROENTEROLOGY | Facility: HOSPITAL | Age: 37
End: 2022-07-08

## 2022-07-08 ENCOUNTER — ANESTHESIA EVENT (OUTPATIENT)
Dept: GASTROENTEROLOGY | Facility: HOSPITAL | Age: 37
End: 2022-07-08

## 2022-07-08 ENCOUNTER — HOSPITAL ENCOUNTER (OUTPATIENT)
Dept: GASTROENTEROLOGY | Facility: HOSPITAL | Age: 37
Setting detail: OUTPATIENT SURGERY
Discharge: HOME | End: 2022-07-08
Attending: SURGERY | Admitting: SURGERY
Payer: COMMERCIAL

## 2022-07-08 VITALS
RESPIRATION RATE: 18 BRPM | SYSTOLIC BLOOD PRESSURE: 134 MMHG | HEART RATE: 62 BPM | BODY MASS INDEX: 42.66 KG/M2 | OXYGEN SATURATION: 95 % | DIASTOLIC BLOOD PRESSURE: 78 MMHG | TEMPERATURE: 97.7 F | WEIGHT: 315 LBS | HEIGHT: 72 IN

## 2022-07-08 DIAGNOSIS — K21.9 GASTROESOPHAGEAL REFLUX DISEASE WITHOUT ESOPHAGITIS: Primary | ICD-10-CM

## 2022-07-08 DIAGNOSIS — E66.01 MORBID OBESITY (HCC): ICD-10-CM

## 2022-07-08 PROCEDURE — 43235 EGD DIAGNOSTIC BRUSH WASH: CPT | Performed by: SURGERY

## 2022-07-08 RX ORDER — PROPOFOL 10 MG/ML
INJECTION, EMULSION INTRAVENOUS AS NEEDED
Status: DISCONTINUED | OUTPATIENT
Start: 2022-07-08 | End: 2022-07-08

## 2022-07-08 RX ORDER — PANTOPRAZOLE SODIUM 40 MG/1
40 TABLET, DELAYED RELEASE ORAL DAILY
Qty: 90 TABLET | Refills: 0 | Status: SHIPPED | OUTPATIENT
Start: 2022-07-08 | End: 2022-07-12

## 2022-07-08 RX ORDER — SODIUM CHLORIDE, SODIUM LACTATE, POTASSIUM CHLORIDE, CALCIUM CHLORIDE 600; 310; 30; 20 MG/100ML; MG/100ML; MG/100ML; MG/100ML
100 INJECTION, SOLUTION INTRAVENOUS CONTINUOUS
Status: DISCONTINUED | OUTPATIENT
Start: 2022-07-08 | End: 2022-07-12 | Stop reason: HOSPADM

## 2022-07-08 RX ORDER — LIDOCAINE HYDROCHLORIDE 10 MG/ML
INJECTION, SOLUTION EPIDURAL; INFILTRATION; INTRACAUDAL; PERINEURAL AS NEEDED
Status: DISCONTINUED | OUTPATIENT
Start: 2022-07-08 | End: 2022-07-08

## 2022-07-08 RX ORDER — SODIUM CHLORIDE, SODIUM LACTATE, POTASSIUM CHLORIDE, CALCIUM CHLORIDE 600; 310; 30; 20 MG/100ML; MG/100ML; MG/100ML; MG/100ML
INJECTION, SOLUTION INTRAVENOUS CONTINUOUS PRN
Status: DISCONTINUED | OUTPATIENT
Start: 2022-07-08 | End: 2022-07-08

## 2022-07-08 RX ADMIN — LIDOCAINE HYDROCHLORIDE 50 MG: 10 INJECTION, SOLUTION EPIDURAL; INFILTRATION; INTRACAUDAL at 09:50

## 2022-07-08 RX ADMIN — PROPOFOL 50 MG: 10 INJECTION, EMULSION INTRAVENOUS at 09:55

## 2022-07-08 RX ADMIN — PROPOFOL 100 MG: 10 INJECTION, EMULSION INTRAVENOUS at 09:50

## 2022-07-08 RX ADMIN — PROPOFOL 30 MG: 10 INJECTION, EMULSION INTRAVENOUS at 09:57

## 2022-07-08 RX ADMIN — PROPOFOL 50 MG: 10 INJECTION, EMULSION INTRAVENOUS at 09:52

## 2022-07-08 RX ADMIN — SODIUM CHLORIDE, SODIUM LACTATE, POTASSIUM CHLORIDE, AND CALCIUM CHLORIDE: .6; .31; .03; .02 INJECTION, SOLUTION INTRAVENOUS at 09:47

## 2022-07-08 RX ADMIN — SODIUM CHLORIDE, SODIUM LACTATE, POTASSIUM CHLORIDE, AND CALCIUM CHLORIDE 100 ML/HR: .6; .31; .03; .02 INJECTION, SOLUTION INTRAVENOUS at 09:27

## 2022-07-08 NOTE — ANESTHESIA POSTPROCEDURE EVALUATION
Post-Op Assessment Note    CV Status:  Stable  Pain Score: 0    Pain management: adequate     Mental Status:  Alert and awake   Hydration Status:  Euvolemic   PONV Controlled:  Controlled   Airway Patency:  Patent      Post Op Vitals Reviewed: Yes      Staff: Anesthesiologist, CRNA         No complications documented      BP   141/80   Temp   97 6   Pulse  84   Resp   18   SpO2   96

## 2022-07-08 NOTE — H&P
This is a 42 y o  male with a history of sleeve gastrectomy in 2018 and Body mass index is 44 13 kg/m²  Here for an EGD to evaluate the anatomy of the GI tract and to rule out the presence of esophagitis    Physical Exam    /68   Pulse 76   Temp 97 8 °F (36 6 °C) (Temporal)   Resp 16   Ht 6' (1 829 m)   Wt (!) 148 kg (325 lb 6 4 oz)   SpO2 97%   BMI 44 13 kg/m²    AAOx3  RR  CTA B  Abdomen ND  Benign  A/P:    This is a 42 y o  male with a history of sleeve gastrectomy in 2018 and Body mass index is 44 13 kg/m²       Will proceed with the EGD and possible biopsies        Ha Pimentel MD, FACS, [facility]  7/8/2022  9:42 AM

## 2022-07-08 NOTE — ANESTHESIA PREPROCEDURE EVALUATION
Procedure:  EGD    Relevant Problems   ANESTHESIA (within normal limits)      CARDIO   (+) Essential hypertension      GI/HEPATIC   (+) Bariatric surgery status   (+) GERD (gastroesophageal reflux disease)      /RENAL   (+) Nephrolithiasis      MUSCULOSKELETAL   (+) Chronic low back pain      NEURO/PSYCH   (+) Chronic low back pain      PULMONARY   (+) Obstructive sleep apnea on CPAP (no longer using CPAP)   (-) Smoking   (-) URI (upper respiratory infection)      Other   (+) Morbid obesity (HCC) (BMI 44)      Physical Exam    Airway    Mallampati score: I  TM Distance: >3 FB  Neck ROM: full     Dental   No notable dental hx     Cardiovascular      Pulmonary      Other Findings       Lab Results   Component Value Date    WBC 6 13 06/25/2022    HGB 14 4 06/25/2022     06/25/2022     Lab Results   Component Value Date    SODIUM 139 06/25/2022    K 4 3 06/25/2022    BUN 11 06/25/2022    CREATININE 0 80 06/25/2022    EGFR 112 06/25/2022     Lab Results   Component Value Date    HGBA1C 5 6 06/25/2022     Anesthesia Plan  ASA Score- 3     Anesthesia Type- IV sedation with anesthesia with ASA Monitors  Additional Monitors:   Airway Plan:           Plan Factors-Exercise tolerance (METS): >4 METS  Chart reviewed  Existing labs reviewed  Patient summary reviewed  Patient is not a current smoker  Induction- intravenous  Postoperative Plan-     Informed Consent- Anesthetic plan and risks discussed with patient  I personally reviewed this patient with the CRNA  Discussed and agreed on the Anesthesia Plan with the CRNA  Cory Melendez

## 2022-07-12 ENCOUNTER — TELEPHONE (OUTPATIENT)
Dept: BARIATRICS | Facility: CLINIC | Age: 37
End: 2022-07-12

## 2022-07-12 DIAGNOSIS — K21.9 GASTROESOPHAGEAL REFLUX DISEASE, UNSPECIFIED WHETHER ESOPHAGITIS PRESENT: Primary | ICD-10-CM

## 2022-07-12 RX ORDER — OMEPRAZOLE 20 MG/1
20 CAPSULE, DELAYED RELEASE ORAL DAILY
Qty: 90 CAPSULE | Refills: 1 | Status: SHIPPED | OUTPATIENT
Start: 2022-07-12

## 2022-07-12 NOTE — TELEPHONE ENCOUNTER
Discussed reflux has worsened since changing from omeprazole 20mg to Protonix 40mg daily  EGD showed HH, dilated sleeve, antral gastritis  Regular Z-line, no biopsies  Discussed revision options and further testing with ph/manometry  He declines as he is moving to [place] in about 5 months  He will continue to follow up for now, avoid gastric irritants

## 2022-07-12 NOTE — TELEPHONE ENCOUNTER
Pt called in advising he had egd last Friday with concerning results and wants to get an appointment to discuss what to do next  It appears the egd was not done by us, but he is a transfer patient  He did mention a concern of cancer    Please advise if we need to schedule him in our office or if he needs to see another specialist

## 2022-07-27 ENCOUNTER — CONSULT (OUTPATIENT)
Dept: BARIATRICS | Facility: CLINIC | Age: 37
End: 2022-07-27
Payer: COMMERCIAL

## 2022-07-27 VITALS
DIASTOLIC BLOOD PRESSURE: 78 MMHG | RESPIRATION RATE: 14 BRPM | WEIGHT: 315 LBS | HEART RATE: 66 BPM | SYSTOLIC BLOOD PRESSURE: 126 MMHG | BODY MASS INDEX: 44.1 KG/M2 | TEMPERATURE: 97.8 F | HEIGHT: 71 IN

## 2022-07-27 DIAGNOSIS — Z98.84 RECENT WEIGHT GAIN AFTER SURGICAL THERAPY FOR OBESITY: ICD-10-CM

## 2022-07-27 DIAGNOSIS — E66.01 OBESITY, CLASS III, BMI 40-49.9 (MORBID OBESITY) (HCC): Primary | ICD-10-CM

## 2022-07-27 DIAGNOSIS — R63.5 RECENT WEIGHT GAIN AFTER SURGICAL THERAPY FOR OBESITY: ICD-10-CM

## 2022-07-27 DIAGNOSIS — K91.2 POSTSURGICAL MALABSORPTION: ICD-10-CM

## 2022-07-27 DIAGNOSIS — I10 ESSENTIAL HYPERTENSION: ICD-10-CM

## 2022-07-27 DIAGNOSIS — F41.8 DEPRESSION WITH ANXIETY: ICD-10-CM

## 2022-07-27 DIAGNOSIS — Z98.84 S/P LAPAROSCOPIC SLEEVE GASTRECTOMY: ICD-10-CM

## 2022-07-27 DIAGNOSIS — Z99.89 OBSTRUCTIVE SLEEP APNEA ON CPAP: ICD-10-CM

## 2022-07-27 DIAGNOSIS — N20.0 NEPHROLITHIASIS: ICD-10-CM

## 2022-07-27 DIAGNOSIS — G47.33 OBSTRUCTIVE SLEEP APNEA ON CPAP: ICD-10-CM

## 2022-07-27 DIAGNOSIS — K42.9 UMBILICAL HERNIA WITHOUT OBSTRUCTION AND WITHOUT GANGRENE: ICD-10-CM

## 2022-07-27 PROCEDURE — 99214 OFFICE O/P EST MOD 30 MIN: CPT | Performed by: INTERNAL MEDICINE

## 2022-07-27 PROCEDURE — 3078F DIAST BP <80 MM HG: CPT | Performed by: INTERNAL MEDICINE

## 2022-07-27 PROCEDURE — 3074F SYST BP LT 130 MM HG: CPT | Performed by: INTERNAL MEDICINE

## 2022-07-27 RX ORDER — BUPROPION HYDROCHLORIDE 150 MG/1
TABLET, EXTENDED RELEASE ORAL
Qty: 60 TABLET | Refills: 1 | Status: SHIPPED | OUTPATIENT
Start: 2022-07-27 | End: 2022-08-19

## 2022-07-27 NOTE — PROGRESS NOTES
Assessment/Plan:  Cesar CARBAJAL was seen today for consult  Diagnoses and all orders for this visit:    Obesity, Class III, BMI 40-49 9 (morbid obesity) (HCC)  -     buPROPion (WELLBUTRIN SR) 150 mg 12 hr tablet; Take one tab twice daily  Recent weight gain after surgical therapy for obesity  Trial Wellbutrin for emotional eating associated with anxiety, depression  Not a topamax candidate due to recurrent kidney stones'  Goals:  Goal protein intake 120g per day  5200-7101 calories   80 oz water     Obstructive sleep apnea on CPAP  Pt to reach out to sleep med as he hasn't been using his CPAP since postop  Has a CPAP at home that was recalled    Postsurgical malabsorption  Vit D deficiency  Started on vitamin D    Essential hypertension  Weight loss and increasing activity level should help  Limit sodium intake <4399GR per day    Umbilical hernia without obstruction and without gangrene  Weight loss plan    Nephrolithiasis  Recommended taking calcium with vit D  Avoid topamax    Depression with anxiety  -     buPROPion (WELLBUTRIN SR) 150 mg 12 hr tablet; Take one tab twice daily  Continue with therapy    Total time spent: 30 minutes with >50% face-to-face time with the patient  Follow up in approximately 2 months with Non-Surgical Physician/Advanced Practitioner, Surgical Dietician and Surgical   Subjective:   Chief Complaint   Patient presents with    Consult     Initial visit with shira       Patient ID  HPI:  Cameron Burgos  is a 42 y o  male with excess weight/obesity here to pursue weight management  Prior bariatric surgery: s/p Vertical Sleeve Gastrectomy in May 2018 at [facility]  Recent EGD showed hiatal hernia, dilated sleeve anatomy with 2 areas of narrowing, and antral gastritis  He is not interested in a surgical revision  Pt planned to move to [address] [place] [address] later this year and plans to continue his bariatric care there      Initial preop: 394lbs  Current: 316 8lbs  Stephen: 265 lbs 1 year s/p surgery  Weight gain +60 lbs from behavioral/dietary changes   He has an umbilical hernia and was recommended weight loss  Stress eater - has been working as a  for 16 years and it's been rough  Once he moves to [facility], plans on getting a different job     [employer] Readings from Last 10 Encounters:   07/27/22 (!) 147 kg (323 lb 6 4 oz)   07/08/22 (!) 148 kg (325 lb 6 4 oz)   04/29/22 (!) 144 kg (316 lb 12 8 oz)   02/03/22 (!) 145 kg (319 lb)   02/02/22 (!) 144 kg (318 lb)   12/23/21 (!) 145 kg (319 lb)   11/12/21 (!) 145 kg (319 lb)   06/22/21 (!) 141 kg (310 lb)   09/24/20 (!) 136 kg (300 lb 12 8 oz)   09/04/20 136 kg (300 lb)     B- leftovers   S-  L- chicken [place] food  S-   D- pasta   S- stops after work by [employer] to get snacks- sweets  Drinks- 32 oz water  Alcohol- rare  Exercise-no  Sleep- wakes up to use the bathroom/his toddler       The following portions of the patient's history were reviewed and updated as appropriate: allergies, current medications, past family history, past medical history, past social history, past surgical history, and problem list     Review of Systems   Respiratory: Negative  Cardiovascular: Negative  Psychiatric/Behavioral: The patient is nervous/anxious  +depressed, no SI       Objective:  /78 (BP Location: Left arm, Patient Position: Sitting, Cuff Size: Large)   Pulse 66   Temp 97 8 °F (36 6 °C) (Tympanic)   Resp 14   Ht 5' 11 06" (1 805 m)   Wt (!) 147 kg (323 lb 6 4 oz)   BMI 45 03 kg/m²   Constitutional: Well-developed, well-nourished and obese Body mass index is 45 03 kg/m²  Hedy Vyas HEENT: No conjunctival pallor or jaundice  Pulmonary: No increased work of breathing or signs of respiratory distress  CV: Normal rate, well-perfused  GI: Obese  Non-distended  MSK: No edema   Neuro: Oriented to person, place and time  Normal Speech  Normal gait  Psych: Normal affect and mood       Labs and Imaging  Most recent labs and imaging reviewed

## 2022-09-02 ENCOUNTER — OFFICE VISIT (OUTPATIENT)
Dept: BARIATRICS | Facility: CLINIC | Age: 37
End: 2022-09-02

## 2022-09-02 DIAGNOSIS — K91.2 POSTSURGICAL MALABSORPTION: Primary | ICD-10-CM

## 2022-09-02 PROCEDURE — RECHECK

## 2022-09-02 NOTE — PROGRESS NOTES
Bariatric Nutrition Assessment Note    -s/p Vertical Sleeve Gastrectomy in May 2018 at [facility]     Referred by Arturo Bhakta PA-C    CC: Regain - desires to get back on track  Pt does not desire revision  For MWM   Met with Dr Mary Gonzalez and to f/u with Dr Guera Strauss"  Current Weight:327 4   (316 8) BMI: 45 7   Weight prior to   Weight Loss Surgery: 394#  NAZANIN: 265 1 year post op  Regain: 50+ pounds      Vitamin Regimen: None - Advised Bariatric multi or OTC X 2 if did not want Bariatric  Bloodwork completed 6/25/2022   Dx Vitamin D deficiency and script ordered and started     Diet and exercise:    · Tolerating a regular diet - Yes  · 3 meals: Yes  · Snacking/grazing - Yes - has toddler so eats with him  · Volume: 1 25 cup  · Eating at least 60 grams of protein per day- Yes  · Protein drinks: No  · Following 30/60 minute rule with liquids- YES  · Eating/drinking: chewing food well- sipping fluids  · Drinking at least 64 ounces of fluid per day- NO - has kidney stones  · Drinking carbonated beverages-no Sparkling water SF  · Food logging - No  · GI discomforts - reflux  Takes omeprazole  · Exercise: Restart gym -   · Other - 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 -Used to eat and didn't feel full  So would overeat and vomit from getting sick - not intentional purging      Diet Recall:   B - bowl of cereal or banana or PB toast Pretzels nuggets   L - leftovers   D - chicken or turkey or pork tenderloins with frozen veggies and rice or pasta  Snacks - sometimes ice cream and toddler snacks , string cheese, pringles, Has sweet tooth  Mindless eating -   Fluids - limited fluids, 1  daily     Visit Summary  Transfer care - post op 4 years > Did well and lost 129lbs and then gradually regained 50+ pounds over the last several years  He works as  and has a  1year old and child on way in October  Used to play football and weight lift, now has bad knees and back which limits him     Would like to get on track  Does not wish to have revision surgery at this time as [place] in near future  Would consider once established elsewhere  Had EGD + reflux seen so pt concerned re: risk for Mayer/Esophageal CA  Gerhardt Sep Experiences  reflux if misses his omeprazole 20mg daily and if he eats within an hour of laying down he has severe reflux and regurgitation  Met with Dr Bryn Almaguer who started pt on Wellbutrin and to f/u with Dr Renny Connolly later this month  Today reviewed post op guidelines to optimize use of tool  Discouraged grazing and other mindless eating habits  Stressed adequate hydration and including protein at each meal and consistent with 30/60 and eating slow  May also use protein drink to assure this  Sample given  Pt understands he need to have tighter control of guidelines but also  admits weight gain related to behaviors associated with his addictive behaviors  To f/u with SW later this month    GOALS:   · Healthy weight loss with good nutrition intakes  · Keep to post op guidelines - specifics emphasized above   · Adequate hydration with at least 64oz  fluid intake  · Normal vitamin and mineral levels  -   · Exercise as able ( has toddler and wife pregnant - works full time)  · Follow up  [facility] and Dr Renny Connolly  ·      Time Spent: 60 minutes

## 2022-09-07 ENCOUNTER — TELEPHONE (OUTPATIENT)
Dept: BARIATRICS | Facility: CLINIC | Age: 37
End: 2022-09-07

## 2022-09-07 NOTE — TELEPHONE ENCOUNTER
A voicemail was left to make pt aware of the new charges for  RD/SW visits  Pt then canceled his appt via [employer]  I then call pt to see if he wanted to r/s and got vm

## 2022-09-29 ENCOUNTER — OFFICE VISIT (OUTPATIENT)
Dept: FAMILY MEDICINE CLINIC | Facility: CLINIC | Age: 37
End: 2022-09-29
Payer: COMMERCIAL

## 2022-09-29 VITALS
HEART RATE: 74 BPM | SYSTOLIC BLOOD PRESSURE: 141 MMHG | DIASTOLIC BLOOD PRESSURE: 83 MMHG | TEMPERATURE: 97.9 F | OXYGEN SATURATION: 96 % | BODY MASS INDEX: 44.1 KG/M2 | WEIGHT: 315 LBS | HEIGHT: 71 IN

## 2022-09-29 DIAGNOSIS — I10 ESSENTIAL HYPERTENSION: ICD-10-CM

## 2022-09-29 DIAGNOSIS — F41.9 ANXIETY: Primary | ICD-10-CM

## 2022-09-29 PROCEDURE — 99214 OFFICE O/P EST MOD 30 MIN: CPT | Performed by: PHYSICIAN ASSISTANT

## 2022-09-29 RX ORDER — SERTRALINE HYDROCHLORIDE 25 MG/1
25 TABLET, FILM COATED ORAL DAILY
Qty: 30 TABLET | Refills: 0 | Status: SHIPPED | OUTPATIENT
Start: 2022-09-29 | End: 2022-10-26

## 2022-09-29 NOTE — LETTER
September 29, 2022     Patient: Chauncey Yeboah  YOB: 1985  Date of Visit: 9/29/2022      To Whom it May Concern:    Chauncey Yeboah is under my professional care  Portia CARBAJAL was seen in my office on 9/29/2022  Portia CARBAJAL may return to work on 11/6/2022  If you have any questions or concerns, please don't hesitate to call           Sincerely,          Belinda Brittle, PA-C        CC: No Recipients

## 2022-09-29 NOTE — PROGRESS NOTES
Name: Blade Zeng      : 1985      MRN: [id number]  Encounter Provider: Moreno Guerrier PA-C  Encounter Date: 2022   Encounter department: [address] [place] [address]     1  Anxiety  -     sertraline (Zoloft) 25 mg tablet; Take 1 tablet (25 mg total) by mouth daily    2  Essential hypertension  after discussion of risks/benefits pt agreeable to stopping wellbutrin and beginning zoloft 25mg  Monitor home BP, HA likely tension related  Goal BP <140/90  1 month follow up, earlier prn       Subjective     Pt presents with concerns of anxiety and feeling overwhelmed  Stressors include work stress, not feeling supported at work, wife is about to give birth, moving to [address] [place] [address], prepping the house for the baby, trouble with his vehicles  He has a hard time managing all of this and feels very overwhelmed  He is on Wellbutrin through weight management but doesn't feel assistance with his weight or with his stress  His BP is elevated and he feels tensions in his neck/HA    Review of Systems   Constitutional: Negative  Neurological: Positive for headaches  Psychiatric/Behavioral: Positive for decreased concentration and sleep disturbance  The patient is nervous/anxious  All other systems reviewed and are negative        Past Medical History:   Diagnosis Date    Arthritis 2007    Bilateral carpal tunnel syndrome 10/31/2017    Cerumen debris on tympanic membrane, right 10/23/2016    Facial paresthesia 2013    GERD (gastroesophageal reflux disease)     Hypertension     Kidney stone     Nephrolithiasis 3/6/2020    Added automatically from request for surgery [id number]    Obesity Birth    Obstructive sleep apnea on CPAP 2012    Added automatically from request for surgery [id number]    Plantar fasciitis of right foot 2013    Prediabetes 2018    Added automatically from request for surgery [id number]    Tear of MCL (medial collateral ligament) of knee     Tear of medial meniscus of knee 2/9/2018     Past Surgical History:   Procedure Laterality Date    ABDOMINAL SURGERY      gastric sleeve 5/2018    BARIATRIC SURGERY      EGD      FL RETROGRADE PYELOGRAM  08/21/2020    FL RETROGRADE PYELOGRAM  09/01/2020    KNEE SURGERY Left     [place] CYSTO/URETERO W/LITHOTRIPSY &INDWELL STENT INSRT Right 08/21/2020    Procedure: CYSTOSCOPY URETEROSCOPY WITH LITHOTRIPSY HOLMIUM LASER, RETROGRADE PYELOGRAM AND INSERTION STENT URETERAL;  Surgeon: Humza Mcclain MD;  [facility];  Service: Urology    [place] CYSTO/URETERO W/LITHOTRIPSY &INDWELL STENT INSRT Right 09/01/2020    Procedure: CYSTOSCOPY URETEROSCOPY WITH LITHOTRIPSY HOLMIUM LASER, RETROGRADE PYELOGRAM AND INSERTION STENT URETERAL;  Surgeon: Humza Mcclain MD;  [facility];  Service: Urology     Family History   Problem Relation Age of Onset    No Known Problems Mother     Hypertension Father         Been on medication for years   Diabetes Father         Became diabetic after bypass surgery   Coronary artery disease Father     Arthritis Father         He has bad arthritis    Social History     Socioeconomic History    Marital status: /Civil Union     Spouse name: None    Number of children: None    Years of education: None    Highest education level: None   Occupational History    None   Tobacco Use    Smoking status: Never Smoker    Smokeless tobacco: Never Used   Vaping Use    Vaping Use: Never used   Substance and Sexual Activity    Alcohol use: Yes     Alcohol/week: 0 0 standard drinks     Comment: I drink maybe one beer every 3 months       Drug use: Never    Sexual activity: Yes     Partners: Female     Birth control/protection: None   Other Topics Concern    None   Social History Narrative    Lives with wife and son     Works at [address] Strain: Not on [employer] Insecurity: Not on file Transportation Needs: Not on file   Physical Activity: Not on file   Stress: Not on file   Social Connections: Not on file   Intimate Partner Violence: Not on file   Housing Stability: Not on file     Current Outpatient Medications on File Prior to Visit   Medication Sig    amLODIPine (NORVASC) 10 mg tablet TAKE 1 TABLET BY MOUTH EVERY DAY    aspirin (ECOTRIN LOW STRENGTH) 81 mg EC tablet     buPROPion (WELLBUTRIN SR) 150 mg 12 hr tablet TAKE 1 TABLET BY MOUTH TWICE A DAY    ergocalciferol (VITAMIN D2) 50,000 units Take 1 capsule (50,000 Units total) by mouth 2 (two) times a week with meals    omeprazole (PriLOSEC) 20 mg delayed release capsule Take 1 capsule (20 mg total) by mouth daily     Allergies   Allergen Reactions    Penicillins Anaphylaxis    Meloxicam Rash     Immunization History   Administered Date(s) Administered    COVID-19 MODERNA VACC 0 5 ML IM 01/20/2021, 02/17/2021    INFLUENZA 11/29/2018    Influenza, injectable, quadrivalent, preservative free 0 5 mL 11/12/2021    Tuberculin Skin Test-PPD Intradermal 11/12/2021       Objective     /83   Pulse 74   Temp 97 9 °F (36 6 °C)   Ht 5' 11" (1 803 m)   Wt (!) 150 kg (330 lb)   SpO2 96%   BMI 46 03 kg/m²     Physical Exam  Vitals and nursing note reviewed  Constitutional:       General: He is not in acute distress  Appearance: Normal appearance  HENT:      Head: Normocephalic and atraumatic  Nose: Nose normal    Eyes:      Pupils: Pupils are equal, round, and reactive to light  Cardiovascular:      Rate and Rhythm: Normal rate and regular rhythm  Heart sounds: Normal heart sounds  No murmur heard  Pulmonary:      Effort: Pulmonary effort is normal  No respiratory distress  Breath sounds: Normal breath sounds  No wheezing, rhonchi or rales  Musculoskeletal:         General: Normal range of motion  Cervical back: Normal range of motion and neck supple  Skin:     General: Skin is warm and dry  Neurological:      Mental Status: He is alert and oriented to person, place, and time     Psychiatric:         Mood and Affect: Mood and affect normal        Flako Jennings PA-C

## 2022-09-29 NOTE — LETTER
September 29, 2022     Patient: Miguelito Viveros  YOB: 1985  Date of Visit: 9/29/2022      To Whom it May Concern:    Miguelito Viveros is under my professional care  Rc CARBAJAL was seen in my office on 9/29/2022  Rc CARBAJAL may return to work on 11/6/2022 without restrictions       If you have any questions or concerns, please don't hesitate to call           Sincerely,          Farzana Sanchez PA-C        CC: No Recipients

## 2022-10-06 DIAGNOSIS — I10 ESSENTIAL HYPERTENSION: ICD-10-CM

## 2022-10-06 RX ORDER — AMLODIPINE BESYLATE 10 MG/1
TABLET ORAL
Qty: 90 TABLET | Refills: 2 | Status: SHIPPED | OUTPATIENT
Start: 2022-10-06

## 2022-10-26 DIAGNOSIS — F41.9 ANXIETY: ICD-10-CM

## 2022-10-26 RX ORDER — SERTRALINE HYDROCHLORIDE 25 MG/1
25 TABLET, FILM COATED ORAL DAILY
Qty: 90 TABLET | Refills: 1 | Status: SHIPPED | OUTPATIENT
Start: 2022-10-26 | End: 2022-10-27 | Stop reason: SDUPTHER

## 2022-10-27 ENCOUNTER — OFFICE VISIT (OUTPATIENT)
Dept: FAMILY MEDICINE CLINIC | Facility: CLINIC | Age: 37
End: 2022-10-27
Payer: COMMERCIAL

## 2022-10-27 VITALS
BODY MASS INDEX: 44.1 KG/M2 | HEART RATE: 68 BPM | SYSTOLIC BLOOD PRESSURE: 124 MMHG | TEMPERATURE: 98 F | WEIGHT: 315 LBS | DIASTOLIC BLOOD PRESSURE: 88 MMHG | HEIGHT: 71 IN | OXYGEN SATURATION: 95 %

## 2022-10-27 DIAGNOSIS — F41.9 ANXIETY: ICD-10-CM

## 2022-10-27 PROCEDURE — 99214 OFFICE O/P EST MOD 30 MIN: CPT | Performed by: PHYSICIAN ASSISTANT

## 2022-10-27 NOTE — PROGRESS NOTES
Name: Thedore Romberg      : 1985      MRN: [id number]  Encounter Provider: Bettie Vogt PA-C  Encounter Date: 10/27/2022   Encounter department: [address] [place] [address]     1  Anxiety  -     sertraline (ZOLOFT) 50 mg tablet; Take 1 tablet (50 mg total) by mouth daily     pt doing much better from an anxiety standpoint  Tolerating the zoloft 25mg well  After discussion pt will increase dose to 50mg daily  Discussed establishing with PCP in [address] [place] [address]  Follow up with me as needed at this time  Subjective     Pt presents for 1 month follow up  Last month started zoloft 25mg for anxiety  He is feeling much better  He is changing jobs and moving  He and his wife had their baby girl  He is excited to move to [place] with his family  No side effects of medication  Review of Systems   Constitutional: Negative  Psychiatric/Behavioral: Negative for dysphoric mood  The patient is nervous/anxious  All other systems reviewed and are negative        Past Medical History:   Diagnosis Date   • Arthritis    • Bilateral carpal tunnel syndrome 10/31/2017   • Cerumen debris on tympanic membrane, right 10/23/2016   • Facial paresthesia 2013   • GERD (gastroesophageal reflux disease)    • Hypertension    • Kidney stone    • Nephrolithiasis 3/6/2020    Added automatically from request for surgery [id number]   • Obesity Birth   • Obstructive sleep apnea on CPAP 2012    Added automatically from request for surgery [id number]   • Plantar fasciitis of right foot 2013   • Prediabetes 2018    Added automatically from request for surgery [id number]   • Tear of MCL (medial collateral ligament) of knee    • Tear of medial meniscus of knee 2018     Past Surgical History:   Procedure Laterality Date   • ABDOMINAL SURGERY      gastric sleeve 2018   • BARIATRIC SURGERY     • EGD     • FL RETROGRADE PYELOGRAM  2020   • FL RETROGRADE PYELOGRAM  2020   • KNEE SURGERY Left    • [place] CYSTO/URETERO W/LITHOTRIPSY &INDWELL STENT INSRT Right 08/21/2020    Procedure: CYSTOSCOPY URETEROSCOPY WITH LITHOTRIPSY HOLMIUM LASER, RETROGRADE PYELOGRAM AND INSERTION STENT URETERAL;  Surgeon: Gil Casas MD;  [facility];  Service: Urology   • [place] CYSTO/URETERO W/LITHOTRIPSY &INDWELL STENT INSRT Right 09/01/2020    Procedure: CYSTOSCOPY URETEROSCOPY WITH LITHOTRIPSY HOLMIUM LASER, RETROGRADE PYELOGRAM AND INSERTION STENT URETERAL;  Surgeon: Gil Casas MD;  [facility];  Service: Urology     Family History   Problem Relation Age of Onset   • No Known Problems Mother    • Hypertension Father         Been on medication for years  • Diabetes Father         Became diabetic after bypass surgery  • Coronary artery disease Father    • Arthritis Father         He has bad arthritis    Social History     Socioeconomic History   • Marital status: /Civil Union     Spouse name: None   • Number of children: None   • Years of education: None   • Highest education level: None   Occupational History   • None   Tobacco Use   • Smoking status: Never Smoker   • Smokeless tobacco: Never Used   Vaping Use   • Vaping Use: Never used   Substance and Sexual Activity   • Alcohol use: Yes     Alcohol/week: 0 0 standard drinks     Comment: I drink maybe one beer every 3 months      • Drug use: Never   • Sexual activity: Yes     Partners: Female     Birth control/protection: None   Other Topics Concern   • None   Social History Narrative    Lives with wife and son     Works at [address] Strain: Not on [employer] Insecurity: Not on file   Transportation Needs: Not on file   Physical Activity: Not on file   Stress: Not on file   Social Connections: Not on file   Intimate Partner Violence: Not on file   Housing Stability: Not on file     Current Outpatient Medications on File Prior to Visit   Medication Sig   • amLODIPine (NORVASC) 10 mg tablet TAKE 1 TABLET BY MOUTH EVERY DAY   • aspirin (ECOTRIN LOW STRENGTH) 81 mg EC tablet    • ergocalciferol (VITAMIN D2) 50,000 units Take 1 capsule (50,000 Units total) by mouth 2 (two) times a week with meals   • omeprazole (PriLOSEC) 20 mg delayed release capsule Take 1 capsule (20 mg total) by mouth daily   • [DISCONTINUED] buPROPion (WELLBUTRIN SR) 150 mg 12 hr tablet TAKE 1 TABLET BY MOUTH TWICE A DAY   • [DISCONTINUED] sertraline (ZOLOFT) 25 mg tablet TAKE 1 TABLET (25 MG TOTAL) BY MOUTH DAILY  Allergies   Allergen Reactions   • Penicillins Anaphylaxis   • Meloxicam Rash     Immunization History   Administered Date(s) Administered   • COVID-19 MODERNA VACC 0 5 ML IM 01/20/2021, 02/17/2021   • INFLUENZA 11/29/2018   • Influenza, injectable, quadrivalent, preservative free 0 5 mL 11/12/2021   • Tuberculin Skin Test-PPD Intradermal 11/12/2021       Objective     /88   Pulse 68   Temp 98 °F (36 7 °C)   Ht 5' 11" (1 803 m)   Wt (!) 149 kg (329 lb)   SpO2 95%   BMI 45 89 kg/m²     Physical Exam  Vitals and nursing note reviewed  Constitutional:       Appearance: Normal appearance  HENT:      Head: Normocephalic and atraumatic  Nose: Nose normal    Pulmonary:      Effort: Pulmonary effort is normal  No respiratory distress  Neurological:      Mental Status: He is alert and oriented to person, place, and time  Psychiatric:         Attention and Perception: Attention and perception normal          Mood and Affect: Affect normal  Mood is anxious  Mood is not depressed  Speech: Speech normal          Behavior: Behavior normal          Thought Content:  Thought content normal        Mike Bach PA-C

## 2023-02-16 DIAGNOSIS — K21.9 GASTROESOPHAGEAL REFLUX DISEASE, UNSPECIFIED WHETHER ESOPHAGITIS PRESENT: ICD-10-CM

## 2023-02-16 RX ORDER — OMEPRAZOLE 20 MG/1
CAPSULE, DELAYED RELEASE ORAL
Qty: 30 CAPSULE | Refills: 2 | Status: SHIPPED | OUTPATIENT
Start: 2023-02-16

## 2023-05-03 DIAGNOSIS — F41.9 ANXIETY: ICD-10-CM

## 2023-05-26 DIAGNOSIS — K21.9 GASTROESOPHAGEAL REFLUX DISEASE, UNSPECIFIED WHETHER ESOPHAGITIS PRESENT: ICD-10-CM

## 2023-05-26 RX ORDER — OMEPRAZOLE 20 MG/1
CAPSULE, DELAYED RELEASE ORAL
Qty: 90 CAPSULE | Refills: 1 | Status: SHIPPED | OUTPATIENT
Start: 2023-05-26

## 2023-07-21 DIAGNOSIS — I10 ESSENTIAL HYPERTENSION: ICD-10-CM

## 2023-07-21 RX ORDER — AMLODIPINE BESYLATE 10 MG/1
TABLET ORAL
Qty: 90 TABLET | Refills: 1 | OUTPATIENT
Start: 2023-07-21

## 2023-07-24 ENCOUNTER — TELEPHONE (OUTPATIENT)
Dept: FAMILY MEDICINE CLINIC | Facility: CLINIC | Age: 38
End: 2023-07-24

## 2023-07-24 DIAGNOSIS — I10 ESSENTIAL HYPERTENSION: ICD-10-CM

## 2023-07-24 RX ORDER — AMLODIPINE BESYLATE 10 MG/1
10 TABLET ORAL DAILY
Qty: 30 TABLET | Refills: 0 | Status: SHIPPED | OUTPATIENT
Start: 2023-07-24

## 2023-07-24 NOTE — TELEPHONE ENCOUNTER
Chelsie, my name is Rosemarie Chao. I was a patient of Doctor Jo-Ann Pickens, and then we moved into [address] on me and my wife, Lurdes Reese. I know I'm no longer her patient, per se, but I'm in a bit of a pickle because my amlodipine, which I need for my blood pressure, ran out today and [facility] won't fill it because I don't longer have a doctor. Because I can't get in down here until next month, I made an appointment months ago when the earliest that you gave me in was August. So I don't have any blood pressure meds and I don't have a way of getting a doctor to fill my script. And I was wondering if there was anything that you guys did that could be done just so I can get my meds for like one more month before I can I go see a doctor down here? My name again is Flako Small. My number is [phone], date of birth [birth year]. Thank you very much.  Bye, bye.

## 2023-07-24 NOTE — TELEPHONE ENCOUNTER
Sent 30 day supply to local pharmacy -- he will have to call to have it transferred as I cannot prescribe out of state
